# Patient Record
Sex: FEMALE | Race: ASIAN | NOT HISPANIC OR LATINO
[De-identification: names, ages, dates, MRNs, and addresses within clinical notes are randomized per-mention and may not be internally consistent; named-entity substitution may affect disease eponyms.]

---

## 2019-04-09 PROBLEM — Z00.00 ENCOUNTER FOR PREVENTIVE HEALTH EXAMINATION: Status: ACTIVE | Noted: 2019-04-09

## 2019-05-17 ENCOUNTER — APPOINTMENT (OUTPATIENT)
Dept: DERMATOLOGY | Facility: CLINIC | Age: 29
End: 2019-05-17
Payer: COMMERCIAL

## 2019-05-17 VITALS
WEIGHT: 125 LBS | HEIGHT: 62 IN | BODY MASS INDEX: 23 KG/M2 | DIASTOLIC BLOOD PRESSURE: 70 MMHG | SYSTOLIC BLOOD PRESSURE: 90 MMHG

## 2019-05-17 DIAGNOSIS — L81.0 POSTINFLAMMATORY HYPERPIGMENTATION: ICD-10-CM

## 2019-05-17 DIAGNOSIS — Z87.2 PERSONAL HISTORY OF DISEASES OF THE SKIN AND SUBCUTANEOUS TISSUE: ICD-10-CM

## 2019-05-17 DIAGNOSIS — L70.0 ACNE VULGARIS: ICD-10-CM

## 2019-05-17 DIAGNOSIS — Z91.89 OTHER SPECIFIED PERSONAL RISK FACTORS, NOT ELSEWHERE CLASSIFIED: ICD-10-CM

## 2019-05-17 PROCEDURE — 99203 OFFICE O/P NEW LOW 30 MIN: CPT

## 2019-05-17 RX ORDER — AZELAIC ACID 0.15 G/G
15 GEL TOPICAL DAILY
Qty: 1 | Refills: 3 | Status: ACTIVE | COMMUNITY
Start: 2019-05-17 | End: 1900-01-01

## 2019-05-17 RX ORDER — CLINDAMYCIN PHOSPHATE 10 MG/ML
1 LOTION TOPICAL
Qty: 1 | Refills: 5 | Status: ACTIVE | COMMUNITY
Start: 2019-05-17 | End: 1900-01-01

## 2019-05-22 ENCOUNTER — APPOINTMENT (OUTPATIENT)
Dept: OBGYN | Facility: CLINIC | Age: 29
End: 2019-05-22
Payer: COMMERCIAL

## 2019-05-22 VITALS
SYSTOLIC BLOOD PRESSURE: 100 MMHG | DIASTOLIC BLOOD PRESSURE: 63 MMHG | HEIGHT: 62 IN | BODY MASS INDEX: 24.66 KG/M2 | WEIGHT: 134 LBS

## 2019-05-22 DIAGNOSIS — N80.9 ENDOMETRIOSIS, UNSPECIFIED: ICD-10-CM

## 2019-05-22 DIAGNOSIS — Z01.411 ENCOUNTER FOR GYNECOLOGICAL EXAMINATION (GENERAL) (ROUTINE) WITH ABNORMAL FINDINGS: ICD-10-CM

## 2019-05-22 DIAGNOSIS — N83.292 OTHER OVARIAN CYST, LEFT SIDE: ICD-10-CM

## 2019-05-22 PROCEDURE — 99385 PREV VISIT NEW AGE 18-39: CPT

## 2019-05-22 RX ORDER — LEVONORGESTREL AND ETHINYL ESTRADIOL 0.1-0.02MG
KIT ORAL
Refills: 0 | Status: ACTIVE | COMMUNITY

## 2019-05-22 NOTE — CHIEF COMPLAINT
[Annual Visit] : annual visit [FreeTextEntry1] : 28 y.o P 0000  LMP 2019, presents for establishment of GYN care . pt gives hx of having a left ovarian cystectomy, in  and was diagnosed with endometriosis . pt reports dyspareunia. pt had been receiving OCP's to control symptomatology of endometriosis. ( [place]). . pt stopped taking OCP ' s from 2018, until 2019. and was without pelvic pain,  but went back on the pills in March . pt also describes vaginismus. . PMH- Acne Vulgaris, spontaneous pneumothorax.. PSHx - Laparoscopic Ovarian Cystectomy . and diagnosis of endometriosis FH - Diabetes - PGM, (  ). Pancreatic ca - MGM ( ). , SH- negative, ROS currently unemployed. pt is looking to conceive, pt just finished residency in Orthodontics.

## 2019-05-23 ENCOUNTER — ASOB RESULT (OUTPATIENT)
Age: 29
End: 2019-05-23

## 2019-05-23 ENCOUNTER — APPOINTMENT (OUTPATIENT)
Dept: OBGYN | Facility: CLINIC | Age: 29
End: 2019-05-23
Payer: COMMERCIAL

## 2019-05-23 PROCEDURE — 76830 TRANSVAGINAL US NON-OB: CPT

## 2019-05-24 LAB
C TRACH RRNA SPEC QL NAA+PROBE: NOT DETECTED
N GONORRHOEA RRNA SPEC QL NAA+PROBE: NOT DETECTED
SOURCE AMPLIFICATION: NORMAL

## 2019-05-28 LAB — CYTOLOGY CVX/VAG DOC THIN PREP: NORMAL

## 2019-08-27 ENCOUNTER — APPOINTMENT (OUTPATIENT)
Dept: OBGYN | Facility: CLINIC | Age: 29
End: 2019-08-27

## 2019-08-29 ENCOUNTER — APPOINTMENT (OUTPATIENT)
Dept: OBGYN | Facility: CLINIC | Age: 29
End: 2019-08-29

## 2019-08-29 ENCOUNTER — OTHER (OUTPATIENT)
Age: 29
End: 2019-08-29

## 2019-08-30 LAB
ESTRADIOL SERPL-MCNC: 21 PG/ML
FSH SERPL-MCNC: 4.7 IU/L

## 2019-09-03 LAB — ANTI-MUELLERIAN HORMONE: 4.42 NG/ML

## 2019-10-03 ENCOUNTER — FORM ENCOUNTER (OUTPATIENT)
Age: 29
End: 2019-10-03

## 2019-10-04 ENCOUNTER — OUTPATIENT (OUTPATIENT)
Dept: OUTPATIENT SERVICES | Facility: HOSPITAL | Age: 29
LOS: 1 days | End: 2019-10-04
Payer: COMMERCIAL

## 2019-10-04 DIAGNOSIS — N80.9 ENDOMETRIOSIS, UNSPECIFIED: ICD-10-CM

## 2019-10-04 LAB
HCG UR-SCNC: NEGATIVE — SIGNIFICANT CHANGE UP
SP GR UR: 1.03 — SIGNIFICANT CHANGE UP (ref 1–1.03)

## 2019-10-04 PROCEDURE — 58340 CATHETER FOR HYSTEROGRAPHY: CPT | Mod: GC

## 2019-10-04 PROCEDURE — 74740 X-RAY FEMALE GENITAL TRACT: CPT | Mod: 26,GC

## 2019-10-08 ENCOUNTER — CHART COPY (OUTPATIENT)
Age: 29
End: 2019-10-08

## 2019-10-08 DIAGNOSIS — N97.9 FEMALE INFERTILITY, UNSPECIFIED: ICD-10-CM

## 2019-10-25 ENCOUNTER — APPOINTMENT (OUTPATIENT)
Dept: HUMAN REPRODUCTION | Facility: CLINIC | Age: 29
End: 2019-10-25

## 2019-11-07 ENCOUNTER — APPOINTMENT (OUTPATIENT)
Dept: HUMAN REPRODUCTION | Facility: CLINIC | Age: 29
End: 2019-11-07
Payer: COMMERCIAL

## 2019-11-07 PROCEDURE — 99205 OFFICE O/P NEW HI 60 MIN: CPT | Mod: 25

## 2019-11-07 PROCEDURE — 76830 TRANSVAGINAL US NON-OB: CPT

## 2019-12-10 ENCOUNTER — APPOINTMENT (OUTPATIENT)
Dept: HUMAN REPRODUCTION | Facility: CLINIC | Age: 29
End: 2019-12-10
Payer: COMMERCIAL

## 2019-12-10 PROCEDURE — 99213 OFFICE O/P EST LOW 20 MIN: CPT | Mod: 25

## 2019-12-10 PROCEDURE — 76830 TRANSVAGINAL US NON-OB: CPT

## 2019-12-18 ENCOUNTER — APPOINTMENT (OUTPATIENT)
Dept: HUMAN REPRODUCTION | Facility: CLINIC | Age: 29
End: 2019-12-18
Payer: COMMERCIAL

## 2019-12-18 PROCEDURE — 99213 OFFICE O/P EST LOW 20 MIN: CPT | Mod: 25

## 2019-12-18 PROCEDURE — 76830 TRANSVAGINAL US NON-OB: CPT

## 2019-12-20 ENCOUNTER — APPOINTMENT (OUTPATIENT)
Dept: HUMAN REPRODUCTION | Facility: CLINIC | Age: 29
End: 2019-12-20
Payer: COMMERCIAL

## 2019-12-20 PROCEDURE — 76830 TRANSVAGINAL US NON-OB: CPT

## 2019-12-20 PROCEDURE — 99213 OFFICE O/P EST LOW 20 MIN: CPT | Mod: 25

## 2019-12-23 ENCOUNTER — APPOINTMENT (OUTPATIENT)
Dept: HUMAN REPRODUCTION | Facility: CLINIC | Age: 29
End: 2019-12-23
Payer: COMMERCIAL

## 2019-12-23 PROCEDURE — 99213 OFFICE O/P EST LOW 20 MIN: CPT | Mod: 25

## 2019-12-23 PROCEDURE — 58322 ARTIFICIAL INSEMINATION: CPT

## 2020-01-06 ENCOUNTER — APPOINTMENT (OUTPATIENT)
Dept: HUMAN REPRODUCTION | Facility: CLINIC | Age: 30
End: 2020-01-06
Payer: COMMERCIAL

## 2020-01-06 PROCEDURE — 99213 OFFICE O/P EST LOW 20 MIN: CPT | Mod: 25

## 2020-01-06 PROCEDURE — 76830 TRANSVAGINAL US NON-OB: CPT

## 2020-01-15 ENCOUNTER — APPOINTMENT (OUTPATIENT)
Dept: HUMAN REPRODUCTION | Facility: CLINIC | Age: 30
End: 2020-01-15
Payer: COMMERCIAL

## 2020-01-15 PROCEDURE — 99213 OFFICE O/P EST LOW 20 MIN: CPT | Mod: 25

## 2020-01-15 PROCEDURE — 76830 TRANSVAGINAL US NON-OB: CPT

## 2020-01-17 ENCOUNTER — APPOINTMENT (OUTPATIENT)
Dept: HUMAN REPRODUCTION | Facility: CLINIC | Age: 30
End: 2020-01-17
Payer: COMMERCIAL

## 2020-01-17 PROCEDURE — 99213 OFFICE O/P EST LOW 20 MIN: CPT | Mod: 25

## 2020-01-17 PROCEDURE — 76830 TRANSVAGINAL US NON-OB: CPT

## 2020-01-20 ENCOUNTER — APPOINTMENT (OUTPATIENT)
Dept: HUMAN REPRODUCTION | Facility: CLINIC | Age: 30
End: 2020-01-20
Payer: COMMERCIAL

## 2020-01-20 PROCEDURE — 58322 ARTIFICIAL INSEMINATION: CPT

## 2020-01-20 PROCEDURE — 99213 OFFICE O/P EST LOW 20 MIN: CPT | Mod: 25

## 2020-01-20 PROCEDURE — 89261 SPERM ISOLATION COMPLEX: CPT

## 2020-02-03 ENCOUNTER — APPOINTMENT (OUTPATIENT)
Dept: HUMAN REPRODUCTION | Facility: CLINIC | Age: 30
End: 2020-02-03
Payer: COMMERCIAL

## 2020-02-03 PROCEDURE — 76830 TRANSVAGINAL US NON-OB: CPT

## 2020-02-03 PROCEDURE — 99213 OFFICE O/P EST LOW 20 MIN: CPT | Mod: 25

## 2020-02-13 ENCOUNTER — APPOINTMENT (OUTPATIENT)
Dept: HUMAN REPRODUCTION | Facility: CLINIC | Age: 30
End: 2020-02-13
Payer: COMMERCIAL

## 2020-02-13 PROCEDURE — 76830 TRANSVAGINAL US NON-OB: CPT

## 2020-02-13 PROCEDURE — 99214 OFFICE O/P EST MOD 30 MIN: CPT

## 2020-02-13 PROCEDURE — 99213 OFFICE O/P EST LOW 20 MIN: CPT | Mod: 25

## 2020-02-15 ENCOUNTER — APPOINTMENT (OUTPATIENT)
Dept: HUMAN REPRODUCTION | Facility: CLINIC | Age: 30
End: 2020-02-15
Payer: COMMERCIAL

## 2020-02-15 PROCEDURE — 58322 ARTIFICIAL INSEMINATION: CPT

## 2020-02-15 PROCEDURE — 89261 SPERM ISOLATION COMPLEX: CPT

## 2020-02-15 PROCEDURE — 99213 OFFICE O/P EST LOW 20 MIN: CPT | Mod: 25

## 2020-03-02 ENCOUNTER — APPOINTMENT (OUTPATIENT)
Dept: HUMAN REPRODUCTION | Facility: CLINIC | Age: 30
End: 2020-03-02

## 2020-03-14 ENCOUNTER — EMERGENCY (EMERGENCY)
Facility: HOSPITAL | Age: 30
LOS: 1 days | Discharge: HOME | End: 2020-03-14
Attending: EMERGENCY MEDICINE | Admitting: EMERGENCY MEDICINE
Payer: COMMERCIAL

## 2020-03-14 VITALS
SYSTOLIC BLOOD PRESSURE: 100 MMHG | DIASTOLIC BLOOD PRESSURE: 64 MMHG | RESPIRATION RATE: 16 BRPM | HEART RATE: 125 BPM | TEMPERATURE: 102 F | OXYGEN SATURATION: 100 %

## 2020-03-14 VITALS
DIASTOLIC BLOOD PRESSURE: 61 MMHG | RESPIRATION RATE: 16 BRPM | SYSTOLIC BLOOD PRESSURE: 103 MMHG | OXYGEN SATURATION: 100 % | HEART RATE: 108 BPM | TEMPERATURE: 100 F

## 2020-03-14 LAB
ALBUMIN SERPL ELPH-MCNC: 4.5 G/DL — SIGNIFICANT CHANGE UP (ref 3.3–5)
ALP SERPL-CCNC: 40 U/L — SIGNIFICANT CHANGE UP (ref 40–120)
ALT FLD-CCNC: 15 U/L — SIGNIFICANT CHANGE UP (ref 4–33)
ANION GAP SERPL CALC-SCNC: 16 MMO/L — HIGH (ref 7–14)
APPEARANCE UR: CLEAR — SIGNIFICANT CHANGE UP
AST SERPL-CCNC: 25 U/L — SIGNIFICANT CHANGE UP (ref 4–32)
BACTERIA # UR AUTO: SIGNIFICANT CHANGE UP
BASE EXCESS BLDV CALC-SCNC: 1.4 MMOL/L — SIGNIFICANT CHANGE UP
BASOPHILS # BLD AUTO: 0.01 K/UL — SIGNIFICANT CHANGE UP (ref 0–0.2)
BASOPHILS NFR BLD AUTO: 0.2 % — SIGNIFICANT CHANGE UP (ref 0–2)
BILIRUB SERPL-MCNC: < 0.2 MG/DL — LOW (ref 0.2–1.2)
BILIRUB UR-MCNC: NEGATIVE — SIGNIFICANT CHANGE UP
BLOOD GAS VENOUS - CREATININE: 0.83 MG/DL — SIGNIFICANT CHANGE UP (ref 0.5–1.3)
BLOOD UR QL VISUAL: NEGATIVE — SIGNIFICANT CHANGE UP
BUN SERPL-MCNC: 7 MG/DL — SIGNIFICANT CHANGE UP (ref 7–23)
CALCIUM SERPL-MCNC: 9.3 MG/DL — SIGNIFICANT CHANGE UP (ref 8.4–10.5)
CHLORIDE BLDV-SCNC: 103 MMOL/L — SIGNIFICANT CHANGE UP (ref 96–108)
CHLORIDE SERPL-SCNC: 100 MMOL/L — SIGNIFICANT CHANGE UP (ref 98–107)
CO2 SERPL-SCNC: 22 MMOL/L — SIGNIFICANT CHANGE UP (ref 22–31)
COLOR SPEC: SIGNIFICANT CHANGE UP
CREAT SERPL-MCNC: 0.72 MG/DL — SIGNIFICANT CHANGE UP (ref 0.5–1.3)
EOSINOPHIL # BLD AUTO: 0.01 K/UL — SIGNIFICANT CHANGE UP (ref 0–0.5)
EOSINOPHIL NFR BLD AUTO: 0.2 % — SIGNIFICANT CHANGE UP (ref 0–6)
GAS PNL BLDV: 137 MMOL/L — SIGNIFICANT CHANGE UP (ref 136–146)
GLUCOSE BLDV-MCNC: 108 MG/DL — HIGH (ref 70–99)
GLUCOSE SERPL-MCNC: 114 MG/DL — HIGH (ref 70–99)
GLUCOSE UR-MCNC: NEGATIVE — SIGNIFICANT CHANGE UP
HCO3 BLDV-SCNC: 25 MMOL/L — SIGNIFICANT CHANGE UP (ref 20–27)
HCT VFR BLD CALC: 36.9 % — SIGNIFICANT CHANGE UP (ref 34.5–45)
HCT VFR BLDV CALC: 37.9 % — SIGNIFICANT CHANGE UP (ref 34.5–45)
HGB BLD-MCNC: 12.3 G/DL — SIGNIFICANT CHANGE UP (ref 11.5–15.5)
HGB BLDV-MCNC: 12.3 G/DL — SIGNIFICANT CHANGE UP (ref 11.5–15.5)
IMM GRANULOCYTES NFR BLD AUTO: 0.5 % — SIGNIFICANT CHANGE UP (ref 0–1.5)
KETONES UR-MCNC: SIGNIFICANT CHANGE UP
LACTATE BLDV-MCNC: 1.2 MMOL/L — SIGNIFICANT CHANGE UP (ref 0.5–2)
LEUKOCYTE ESTERASE UR-ACNC: NEGATIVE — SIGNIFICANT CHANGE UP
LYMPHOCYTES # BLD AUTO: 0.27 K/UL — LOW (ref 1–3.3)
LYMPHOCYTES # BLD AUTO: 4.6 % — LOW (ref 13–44)
MCHC RBC-ENTMCNC: 31.2 PG — SIGNIFICANT CHANGE UP (ref 27–34)
MCHC RBC-ENTMCNC: 33.3 % — SIGNIFICANT CHANGE UP (ref 32–36)
MCV RBC AUTO: 93.7 FL — SIGNIFICANT CHANGE UP (ref 80–100)
MONOCYTES # BLD AUTO: 0.4 K/UL — SIGNIFICANT CHANGE UP (ref 0–0.9)
MONOCYTES NFR BLD AUTO: 6.8 % — SIGNIFICANT CHANGE UP (ref 2–14)
NEUTROPHILS # BLD AUTO: 5.2 K/UL — SIGNIFICANT CHANGE UP (ref 1.8–7.4)
NEUTROPHILS NFR BLD AUTO: 87.7 % — HIGH (ref 43–77)
NITRITE UR-MCNC: NEGATIVE — SIGNIFICANT CHANGE UP
NRBC # FLD: 0 K/UL — SIGNIFICANT CHANGE UP (ref 0–0)
PCO2 BLDV: 43 MMHG — SIGNIFICANT CHANGE UP (ref 41–51)
PH BLDV: 7.39 PH — SIGNIFICANT CHANGE UP (ref 7.32–7.43)
PH UR: 7.5 — SIGNIFICANT CHANGE UP (ref 5–8)
PLATELET # BLD AUTO: 166 K/UL — SIGNIFICANT CHANGE UP (ref 150–400)
PMV BLD: 10.3 FL — SIGNIFICANT CHANGE UP (ref 7–13)
PO2 BLDV: 48 MMHG — HIGH (ref 35–40)
POTASSIUM BLDV-SCNC: 3.2 MMOL/L — LOW (ref 3.4–4.5)
POTASSIUM SERPL-MCNC: 3.5 MMOL/L — SIGNIFICANT CHANGE UP (ref 3.5–5.3)
POTASSIUM SERPL-SCNC: 3.5 MMOL/L — SIGNIFICANT CHANGE UP (ref 3.5–5.3)
PROT SERPL-MCNC: 7.3 G/DL — SIGNIFICANT CHANGE UP (ref 6–8.3)
PROT UR-MCNC: 30 — SIGNIFICANT CHANGE UP
RBC # BLD: 3.94 M/UL — SIGNIFICANT CHANGE UP (ref 3.8–5.2)
RBC # FLD: 12.5 % — SIGNIFICANT CHANGE UP (ref 10.3–14.5)
RBC CASTS # UR COMP ASSIST: SIGNIFICANT CHANGE UP (ref 0–?)
SAO2 % BLDV: 80.6 % — SIGNIFICANT CHANGE UP (ref 60–85)
SODIUM SERPL-SCNC: 138 MMOL/L — SIGNIFICANT CHANGE UP (ref 135–145)
SP GR SPEC: 1.02 — SIGNIFICANT CHANGE UP (ref 1–1.04)
SQUAMOUS # UR AUTO: SIGNIFICANT CHANGE UP
UROBILINOGEN FLD QL: NORMAL — SIGNIFICANT CHANGE UP
WBC # BLD: 5.92 K/UL — SIGNIFICANT CHANGE UP (ref 3.8–10.5)
WBC # FLD AUTO: 5.92 K/UL — SIGNIFICANT CHANGE UP (ref 3.8–10.5)
WBC UR QL: SIGNIFICANT CHANGE UP (ref 0–?)

## 2020-03-14 PROCEDURE — 71045 X-RAY EXAM CHEST 1 VIEW: CPT | Mod: 26

## 2020-03-14 PROCEDURE — 99284 EMERGENCY DEPT VISIT MOD MDM: CPT

## 2020-03-14 RX ORDER — ACETAMINOPHEN 500 MG
650 TABLET ORAL ONCE
Refills: 0 | Status: COMPLETED | OUTPATIENT
Start: 2020-03-14 | End: 2020-03-14

## 2020-03-14 RX ORDER — ONDANSETRON 8 MG/1
4 TABLET, FILM COATED ORAL ONCE
Refills: 0 | Status: COMPLETED | OUTPATIENT
Start: 2020-03-14 | End: 2020-03-14

## 2020-03-14 RX ORDER — KETOROLAC TROMETHAMINE 30 MG/ML
15 SYRINGE (ML) INJECTION ONCE
Refills: 0 | Status: DISCONTINUED | OUTPATIENT
Start: 2020-03-14 | End: 2020-03-14

## 2020-03-14 RX ORDER — SODIUM CHLORIDE 9 MG/ML
2000 INJECTION INTRAMUSCULAR; INTRAVENOUS; SUBCUTANEOUS ONCE
Refills: 0 | Status: COMPLETED | OUTPATIENT
Start: 2020-03-14 | End: 2020-03-14

## 2020-03-14 RX ADMIN — Medication 650 MILLIGRAM(S): at 19:15

## 2020-03-14 RX ADMIN — SODIUM CHLORIDE 2000 MILLILITER(S): 9 INJECTION INTRAMUSCULAR; INTRAVENOUS; SUBCUTANEOUS at 17:37

## 2020-03-14 RX ADMIN — Medication 650 MILLIGRAM(S): at 18:49

## 2020-03-14 RX ADMIN — ONDANSETRON 4 MILLIGRAM(S): 8 TABLET, FILM COATED ORAL at 17:37

## 2020-03-14 NOTE — ED PROVIDER NOTE - PATIENT PORTAL LINK FT
You can access the FollowMyHealth Patient Portal offered by [facility] by registering at the following website: [email or web address]. By joining [facility]’s FollowMyHealth portal, you will also be able to view your health information using other applications (apps) compatible with our system.

## 2020-03-14 NOTE — ED ADULT NURSE NOTE - OBJECTIVE STATEMENT
Pt a&ox3 c/o fevers, bodyaches for the past two days, pt works in dentist office, denies recent travel, no past medical history, denies known sick contacts, + nausea, breathing even and unlabored, denies any present pain, skin is warm, pt is tachycardic and febrile, ivl placed, labs sent, will continue to monitor.

## 2020-03-14 NOTE — ED PROVIDER NOTE - PHYSICAL EXAMINATION
febrile, tachycardic, borderline BP, NAD, MMM, oropharynx with no lesions, eyes clear, lungs CTAB, heart sounds normal, abd soft, ND, TTP throughout, no G/R, no CVAT, LEs without edema, wwp, skin normal temperature and color, neuro: alert and oriented, no focal deficits febrile, tachycardic, borderline BP, looks ill, MMM, oropharynx with no lesions, eyes clear, lungs CTAB, heart sounds normal, abd soft, ND, TTP throughout, no G/R, no CVAT, LEs without edema, wwp, skin normal temperature and color, neuro: alert and oriented, no focal deficits

## 2020-03-14 NOTE — ED PROVIDER NOTE - PROGRESS NOTE DETAILS
ED Attending: Rei Gordon signed out to me from Dr. Cabot to f/u and reassess. Feeling much better.  COVID sent by Dr. Cabot. DC precautions discussed.

## 2020-03-14 NOTE — ED PROVIDER NOTE - OBJECTIVE STATEMENT
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  She went to , where she was given tamiflu for flu B.  She then developed NBNB vomiting, NB diarrhea, weakness.  She denies urinary sx.  Does not wear eye protection or an N95 at work.  No recent travel or COVID-19 contacts. 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  She went to , where she was given tamiflu for flu B.  She then developed NBNB vomiting, NB diarrhea, weakness.  She denies urinary sx.  Does not wear eye protection or an N95 at work.  No recent travel or COVID-19 contacts.

## 2020-03-14 NOTE — ED PROVIDER NOTE - CLINICAL SUMMARY MEDICAL DECISION MAKING FREE TEXT BOX
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  She went to , where she was given tamiflu for flu B.  She then developed NBNB vomiting, NB diarrhea, weakness.  She denies urinary sx.  Does not wear eye protection or an N95 at work.  No recent travel or COVID-19 contacts.   Febrile, tachycardic, borderline BP, looks ill on exam.  Given significant exposure to many mouths at close contact, she is a risk for COVID-19 as well as flu.  Will send sepsis labs, UA, CXR, COVID-19, RVP, hydrate, treat sx, reassess. 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  She went to , where she was given tamiflu for flu B.  She then developed NBNB vomiting, NB diarrhea, weakness.  She denies urinary sx.  Does not wear eye protection or an N95 at work.  No recent travel or COVID-19 contacts.  Febrile, tachycardic, borderline BP, looks ill on exam.  Given significant exposure to many mouths at close contact, she is a risk for COVID-19 as well as flu.  Will send sepsis labs, UA, CXR, COVID-19, RVP, hydrate, treat sx, reassess.

## 2020-03-14 NOTE — ED ADULT TRIAGE NOTE - CHIEF COMPLAINT QUOTE
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

## 2020-03-14 NOTE — ED PROVIDER NOTE - NSFOLLOWUPINSTRUCTIONS_ED_ALL_ED_FT
Please take Ibuprofen 600 mg every 6 hours as needed for pain or fever.  You may also Take Tylenol 650 mg every 6 hours.     Follow instructions on discharge packet provided to you.     Return to the ER for trouble breathing, uncontrolled fevers, or other concerning signs. Please see your primary doctor for follow up .

## 2020-03-16 LAB
CULTURE RESULTS: SIGNIFICANT CHANGE UP
SARS-COV-2 RNA SPEC QL NAA+PROBE: SIGNIFICANT CHANGE UP
SPECIMEN SOURCE: SIGNIFICANT CHANGE UP

## 2020-03-17 NOTE — ED POST DISCHARGE NOTE - REASON FOR FOLLOW-UP
Other Patient called asking that we email her a letter that COVID-19 test was negative. Emailed to [email or web address]

## 2020-03-20 LAB
CULTURE RESULTS: SIGNIFICANT CHANGE UP
CULTURE RESULTS: SIGNIFICANT CHANGE UP
SPECIMEN SOURCE: SIGNIFICANT CHANGE UP
SPECIMEN SOURCE: SIGNIFICANT CHANGE UP

## 2020-11-23 ENCOUNTER — APPOINTMENT (OUTPATIENT)
Dept: ANTEPARTUM | Facility: CLINIC | Age: 30
End: 2020-11-23
Payer: COMMERCIAL

## 2020-11-23 ENCOUNTER — ASOB RESULT (OUTPATIENT)
Age: 30
End: 2020-11-23

## 2020-11-23 PROCEDURE — 76811 OB US DETAILED SNGL FETUS: CPT

## 2021-03-27 ENCOUNTER — TRANSCRIPTION ENCOUNTER (OUTPATIENT)
Age: 31
End: 2021-03-27

## 2021-03-27 ENCOUNTER — INPATIENT (INPATIENT)
Facility: HOSPITAL | Age: 31
LOS: 2 days | Discharge: HOME | End: 2021-03-30
Attending: OBSTETRICS & GYNECOLOGY | Admitting: OBSTETRICS & GYNECOLOGY
Payer: COMMERCIAL

## 2021-03-27 DIAGNOSIS — Z3A.00 WEEKS OF GESTATION OF PREGNANCY NOT SPECIFIED: ICD-10-CM

## 2021-03-27 DIAGNOSIS — O26.899 OTHER SPECIFIED PREGNANCY RELATED CONDITIONS, UNSPECIFIED TRIMESTER: ICD-10-CM

## 2021-03-28 VITALS
SYSTOLIC BLOOD PRESSURE: 119 MMHG | RESPIRATION RATE: 16 BRPM | HEART RATE: 78 BPM | TEMPERATURE: 98 F | DIASTOLIC BLOOD PRESSURE: 59 MMHG | OXYGEN SATURATION: 98 %

## 2021-03-28 DIAGNOSIS — Z98.890 OTHER SPECIFIED POSTPROCEDURAL STATES: Chronic | ICD-10-CM

## 2021-03-28 DIAGNOSIS — Z34.80 ENCOUNTER FOR SUPERVISION OF OTHER NORMAL PREGNANCY, UNSPECIFIED TRIMESTER: ICD-10-CM

## 2021-03-28 LAB
BASOPHILS # BLD AUTO: 0.02 K/UL — SIGNIFICANT CHANGE UP (ref 0–0.2)
BASOPHILS NFR BLD AUTO: 0.2 % — SIGNIFICANT CHANGE UP (ref 0–2)
BLD GP AB SCN SERPL QL: POSITIVE — SIGNIFICANT CHANGE UP
COVID-19 SPIKE DOMAIN AB INTERP: NEGATIVE — SIGNIFICANT CHANGE UP
COVID-19 SPIKE DOMAIN ANTIBODY RESULT: 0.4 U/ML — SIGNIFICANT CHANGE UP
EOSINOPHIL # BLD AUTO: 0.04 K/UL — SIGNIFICANT CHANGE UP (ref 0–0.5)
EOSINOPHIL NFR BLD AUTO: 0.4 % — SIGNIFICANT CHANGE UP (ref 0–6)
HCT VFR BLD CALC: 39.5 % — SIGNIFICANT CHANGE UP (ref 34.5–45)
HGB BLD-MCNC: 13.2 G/DL — SIGNIFICANT CHANGE UP (ref 11.5–15.5)
IMM GRANULOCYTES NFR BLD AUTO: 0.4 % — SIGNIFICANT CHANGE UP (ref 0–1.5)
LYMPHOCYTES # BLD AUTO: 2.72 K/UL — SIGNIFICANT CHANGE UP (ref 1–3.3)
LYMPHOCYTES # BLD AUTO: 28.4 % — SIGNIFICANT CHANGE UP (ref 13–44)
MCHC RBC-ENTMCNC: 32.2 PG — SIGNIFICANT CHANGE UP (ref 27–34)
MCHC RBC-ENTMCNC: 33.4 GM/DL — SIGNIFICANT CHANGE UP (ref 32–36)
MCV RBC AUTO: 96.3 FL — SIGNIFICANT CHANGE UP (ref 80–100)
MONOCYTES # BLD AUTO: 0.55 K/UL — SIGNIFICANT CHANGE UP (ref 0–0.9)
MONOCYTES NFR BLD AUTO: 5.7 % — SIGNIFICANT CHANGE UP (ref 2–14)
NEUTROPHILS # BLD AUTO: 6.21 K/UL — SIGNIFICANT CHANGE UP (ref 1.8–7.4)
NEUTROPHILS NFR BLD AUTO: 64.9 % — SIGNIFICANT CHANGE UP (ref 43–77)
NRBC # BLD: 0 /100 WBCS — SIGNIFICANT CHANGE UP (ref 0–0)
PLATELET # BLD AUTO: 165 K/UL — SIGNIFICANT CHANGE UP (ref 150–400)
RBC # BLD: 4.1 M/UL — SIGNIFICANT CHANGE UP (ref 3.8–5.2)
RBC # FLD: 13 % — SIGNIFICANT CHANGE UP (ref 10.3–14.5)
RH IG SCN BLD-IMP: NEGATIVE — SIGNIFICANT CHANGE UP
RH IG SCN BLD-IMP: NEGATIVE — SIGNIFICANT CHANGE UP
SARS-COV-2 IGG+IGM SERPL QL IA: 0.4 U/ML — SIGNIFICANT CHANGE UP
SARS-COV-2 IGG+IGM SERPL QL IA: NEGATIVE — SIGNIFICANT CHANGE UP
SARS-COV-2 RNA SPEC QL NAA+PROBE: SIGNIFICANT CHANGE UP
WBC # BLD: 9.58 K/UL — SIGNIFICANT CHANGE UP (ref 3.8–10.5)
WBC # FLD AUTO: 9.58 K/UL — SIGNIFICANT CHANGE UP (ref 3.8–10.5)

## 2021-03-28 PROCEDURE — 86077 PHYS BLOOD BANK SERV XMATCH: CPT

## 2021-03-28 PROCEDURE — 88307 TISSUE EXAM BY PATHOLOGIST: CPT | Mod: 26

## 2021-03-28 RX ORDER — OXYTOCIN 10 UNIT/ML
333.33 VIAL (ML) INJECTION
Qty: 20 | Refills: 0 | Status: COMPLETED | OUTPATIENT
Start: 2021-03-28 | End: 2021-03-28

## 2021-03-28 RX ORDER — SIMETHICONE 80 MG/1
80 TABLET, CHEWABLE ORAL EVERY 4 HOURS
Refills: 0 | Status: DISCONTINUED | OUTPATIENT
Start: 2021-03-28 | End: 2021-03-30

## 2021-03-28 RX ORDER — BENZOCAINE 10 %
1 GEL (GRAM) MUCOUS MEMBRANE EVERY 6 HOURS
Refills: 0 | Status: DISCONTINUED | OUTPATIENT
Start: 2021-03-28 | End: 2021-03-30

## 2021-03-28 RX ORDER — AER TRAVELER 0.5 G/1
1 SOLUTION RECTAL; TOPICAL EVERY 4 HOURS
Refills: 0 | Status: DISCONTINUED | OUTPATIENT
Start: 2021-03-28 | End: 2021-03-30

## 2021-03-28 RX ORDER — CITRIC ACID/SODIUM CITRATE 300-500 MG
15 SOLUTION, ORAL ORAL EVERY 6 HOURS
Refills: 0 | Status: DISCONTINUED | OUTPATIENT
Start: 2021-03-28 | End: 2021-03-28

## 2021-03-28 RX ORDER — SODIUM CHLORIDE 9 MG/ML
1000 INJECTION, SOLUTION INTRAVENOUS
Refills: 0 | Status: DISCONTINUED | OUTPATIENT
Start: 2021-03-28 | End: 2021-03-30

## 2021-03-28 RX ORDER — AMPICILLIN TRIHYDRATE 250 MG
1 CAPSULE ORAL EVERY 4 HOURS
Refills: 0 | Status: DISCONTINUED | OUTPATIENT
Start: 2021-03-28 | End: 2021-03-28

## 2021-03-28 RX ORDER — OXYTOCIN 10 UNIT/ML
333.33 VIAL (ML) INJECTION
Qty: 20 | Refills: 0 | Status: DISCONTINUED | OUTPATIENT
Start: 2021-03-28 | End: 2021-03-30

## 2021-03-28 RX ORDER — IBUPROFEN 200 MG
600 TABLET ORAL EVERY 6 HOURS
Refills: 0 | Status: DISCONTINUED | OUTPATIENT
Start: 2021-03-28 | End: 2021-03-30

## 2021-03-28 RX ORDER — DIPHENHYDRAMINE HCL 50 MG
25 CAPSULE ORAL EVERY 6 HOURS
Refills: 0 | Status: DISCONTINUED | OUTPATIENT
Start: 2021-03-28 | End: 2021-03-30

## 2021-03-28 RX ORDER — SODIUM CHLORIDE 9 MG/ML
3 INJECTION INTRAMUSCULAR; INTRAVENOUS; SUBCUTANEOUS EVERY 8 HOURS
Refills: 0 | Status: DISCONTINUED | OUTPATIENT
Start: 2021-03-28 | End: 2021-03-30

## 2021-03-28 RX ORDER — HYDROCORTISONE 1 %
1 OINTMENT (GRAM) TOPICAL EVERY 6 HOURS
Refills: 0 | Status: DISCONTINUED | OUTPATIENT
Start: 2021-03-28 | End: 2021-03-30

## 2021-03-28 RX ORDER — LANOLIN
1 OINTMENT (GRAM) TOPICAL EVERY 6 HOURS
Refills: 0 | Status: DISCONTINUED | OUTPATIENT
Start: 2021-03-28 | End: 2021-03-30

## 2021-03-28 RX ORDER — OXYCODONE HYDROCHLORIDE 5 MG/1
5 TABLET ORAL
Refills: 0 | Status: DISCONTINUED | OUTPATIENT
Start: 2021-03-28 | End: 2021-03-30

## 2021-03-28 RX ORDER — OXYCODONE HYDROCHLORIDE 5 MG/1
5 TABLET ORAL ONCE
Refills: 0 | Status: DISCONTINUED | OUTPATIENT
Start: 2021-03-28 | End: 2021-03-30

## 2021-03-28 RX ORDER — SODIUM CHLORIDE 9 MG/ML
1000 INJECTION, SOLUTION INTRAVENOUS
Refills: 0 | Status: DISCONTINUED | OUTPATIENT
Start: 2021-03-28 | End: 2021-03-28

## 2021-03-28 RX ORDER — AMPICILLIN TRIHYDRATE 250 MG
2 CAPSULE ORAL ONCE
Refills: 0 | Status: COMPLETED | OUTPATIENT
Start: 2021-03-28 | End: 2021-03-28

## 2021-03-28 RX ORDER — DIBUCAINE 1 %
1 OINTMENT (GRAM) RECTAL EVERY 6 HOURS
Refills: 0 | Status: DISCONTINUED | OUTPATIENT
Start: 2021-03-28 | End: 2021-03-30

## 2021-03-28 RX ORDER — IBUPROFEN 200 MG
600 TABLET ORAL EVERY 6 HOURS
Refills: 0 | Status: COMPLETED | OUTPATIENT
Start: 2021-03-28 | End: 2022-02-24

## 2021-03-28 RX ORDER — MAGNESIUM HYDROXIDE 400 MG/1
30 TABLET, CHEWABLE ORAL
Refills: 0 | Status: DISCONTINUED | OUTPATIENT
Start: 2021-03-28 | End: 2021-03-30

## 2021-03-28 RX ORDER — ACETAMINOPHEN 500 MG
975 TABLET ORAL
Refills: 0 | Status: DISCONTINUED | OUTPATIENT
Start: 2021-03-28 | End: 2021-03-30

## 2021-03-28 RX ORDER — PRAMOXINE HYDROCHLORIDE 150 MG/15G
1 AEROSOL, FOAM RECTAL EVERY 4 HOURS
Refills: 0 | Status: DISCONTINUED | OUTPATIENT
Start: 2021-03-28 | End: 2021-03-30

## 2021-03-28 RX ORDER — KETOROLAC TROMETHAMINE 30 MG/ML
30 SYRINGE (ML) INJECTION ONCE
Refills: 0 | Status: DISCONTINUED | OUTPATIENT
Start: 2021-03-28 | End: 2021-03-28

## 2021-03-28 RX ORDER — TETANUS TOXOID, REDUCED DIPHTHERIA TOXOID AND ACELLULAR PERTUSSIS VACCINE, ADSORBED 5; 2.5; 8; 8; 2.5 [IU]/.5ML; [IU]/.5ML; UG/.5ML; UG/.5ML; UG/.5ML
0.5 SUSPENSION INTRAMUSCULAR ONCE
Refills: 0 | Status: DISCONTINUED | OUTPATIENT
Start: 2021-03-28 | End: 2021-03-30

## 2021-03-28 RX ADMIN — SODIUM CHLORIDE 3 MILLILITER(S): 9 INJECTION INTRAMUSCULAR; INTRAVENOUS; SUBCUTANEOUS at 21:59

## 2021-03-28 RX ADMIN — Medication 108 GRAM(S): at 06:16

## 2021-03-28 RX ADMIN — Medication 975 MILLIGRAM(S): at 16:51

## 2021-03-28 RX ADMIN — SODIUM CHLORIDE 125 MILLILITER(S): 9 INJECTION, SOLUTION INTRAVENOUS at 02:10

## 2021-03-28 RX ADMIN — Medication 108 GRAM(S): at 09:58

## 2021-03-28 RX ADMIN — Medication 600 MILLIGRAM(S): at 21:17

## 2021-03-28 RX ADMIN — Medication 1000 MILLIUNIT(S)/MIN: at 13:08

## 2021-03-28 RX ADMIN — SODIUM CHLORIDE 125 MILLILITER(S): 9 INJECTION, SOLUTION INTRAVENOUS at 01:55

## 2021-03-28 RX ADMIN — Medication 216 GRAM(S): at 02:05

## 2021-03-28 RX ADMIN — Medication 600 MILLIGRAM(S): at 20:17

## 2021-03-28 RX ADMIN — Medication 30 MILLIGRAM(S): at 13:51

## 2021-03-28 NOTE — OB PROVIDER H&P - NSHPPHYSICALEXAM_GEN_ALL_CORE
VS  T(C): 36.8 (03-28-21 @ 00:39)  HR: 64 (03-28-21 @ 02:53)  BP: 112/65 (03-28-21 @ 01:34)  RR: 16 (03-28-21 @ 00:39)  SpO2: 100% (03-28-21 @ 02:53)    Physical Exam:  General: NAD  Abdomen: Soft, NTTP  SSE: Gross pooling, green tinged fluid; Nitrazine positive  SVE: 3/80/-2    NST Reactive 125BPM, +accels, -decels  TOCO no CTX

## 2021-03-28 NOTE — OB PROVIDER H&P - ATTENDING COMMENTS
Patient admitted for PROM  GBS +, PNC complicated by polyhydramnios  RH neg  3cm on admission  Plan for PO cyotec for induction    goran melvin

## 2021-03-28 NOTE — OB RN TRIAGE NOTE - PMH
Endometriosis    Kidney stone    Ovarian cyst  s/p laproscopic removal  Pneumothorax  spontaneous s/p chest tube

## 2021-03-28 NOTE — OB NEONATOLOGY/PEDIATRICIAN DELIVERY SUMMARY - NSPEDSNEONOTESA_OBGYN_ALL_OB_FT
Baby LOREE is a 39+1 week GA male born at 12:12 to a 29 y/o  mother via . Maternal history of spontaneous pneumothorax with chest tube in , kidney stones, ovarian cysts, endometriosis. IVF pregnancy. Maternal blood type A-. Prenatal labs negative and immune, RPR pending. GBS positive on 3/5, received ampx3. SROM <18hrs at 23:00 3/27 with moderate meconium fluid. Baby born vigorous and crying spontaneously. Warmed, dried, stimulated. EOS 0.08. Apgars 8 / 9. Mom plans to breastfeed, consents to hep b, declines circ. Covid negative.

## 2021-03-28 NOTE — OB PROVIDER H&P - ASSESSMENT
Patient is a 29yo  at 39w1d who presents with LOF since 11p found to be grossly ruptured. Admit to labor and delivery for PROM IOL.    -PO  -Re-evaluate for labor progression at 5a  -NST/TOCO  -Ampicillin for GBS ppx  -Routine labs    Melina Ruiz, PGY3

## 2021-03-28 NOTE — OB PROVIDER LABOR PROGRESS NOTE - NS_SUBJECTIVE/OBJECTIVE_OBGYN_ALL_OB_FT
Patient examined for rectal pressure
Patient examined for progress
pt examined for progress
R1 Progress Note     Patient examined for labor progress. Comfortable following epi placement.

## 2021-03-28 NOTE — OB PROVIDER H&P - CLICK TO LAUNCH ORM
Verbal/written post procedure instructions were given to patient/caregiver./Keep the cast/splint/dressing clean and dry./Instructed patient/caregiver to follow-up with primary care physician./Instructed patient/caregiver regarding signs and symptoms of infection.
.

## 2021-03-28 NOTE — OB PROVIDER LABOR PROGRESS NOTE - ASSESSMENT
labor down  cont peanut ball    gchow md 
peanut ball  continue expectant management  comfortable with epidural     goran melvin 
Plan     EFM cat 1   Cont EFM/[facility]     Sharda Maharaj MD PGY1  d/w Dr. Sinha 
start pushing  expect     goran melvin

## 2021-03-28 NOTE — OB PROVIDER H&P - NS_OBGYNHISTORY_OBGYN_ALL_OB_FT
Speaking Coherently
OBH: Current, IVF Pregnancy, succenturiate placental lobe,    GYNH: ovarian cyst s/p laparoscopic cystectomy, endometriosis

## 2021-03-28 NOTE — OB PROVIDER H&P - PSH
H/O chest tube placement  2012  H/O ovarian cystectomy  2017, [facility] [place]

## 2021-03-28 NOTE — OB PROVIDER H&P - HISTORY OF PRESENT ILLNESS
Patient is a 31yo  at 39w 1d who presents w/LOF since 11p. Patient reports large gush of greenish brown fluid at 11p followed by continued leakage. She denies any CTX or VB. She endorses FM, but less than baseline this evening. PNC c/b polyhydramnios per patient.    GBS +  EFW 4000g by jose Friday

## 2021-03-29 ENCOUNTER — TRANSCRIPTION ENCOUNTER (OUTPATIENT)
Age: 31
End: 2021-03-29

## 2021-03-29 LAB
KLEIHAUER-BETKE CALCULATION: 0 % — SIGNIFICANT CHANGE UP (ref 0–0.3)
T PALLIDUM AB TITR SER: NEGATIVE — SIGNIFICANT CHANGE UP

## 2021-03-29 RX ORDER — POLYETHYLENE GLYCOL 3350 17 G/17G
17 POWDER, FOR SOLUTION ORAL DAILY
Refills: 0 | Status: DISCONTINUED | OUTPATIENT
Start: 2021-03-29 | End: 2021-03-30

## 2021-03-29 RX ORDER — CHOLECALCIFEROL (VITAMIN D3) 125 MCG
1 CAPSULE ORAL
Qty: 0 | Refills: 0 | DISCHARGE

## 2021-03-29 RX ADMIN — Medication 600 MILLIGRAM(S): at 17:54

## 2021-03-29 RX ADMIN — Medication 975 MILLIGRAM(S): at 01:04

## 2021-03-29 RX ADMIN — Medication 975 MILLIGRAM(S): at 21:45

## 2021-03-29 RX ADMIN — Medication 1 TABLET(S): at 17:53

## 2021-03-29 RX ADMIN — Medication 975 MILLIGRAM(S): at 14:06

## 2021-03-29 RX ADMIN — Medication 975 MILLIGRAM(S): at 15:00

## 2021-03-29 RX ADMIN — Medication 600 MILLIGRAM(S): at 11:00

## 2021-03-29 RX ADMIN — Medication 975 MILLIGRAM(S): at 00:04

## 2021-03-29 RX ADMIN — Medication 975 MILLIGRAM(S): at 21:16

## 2021-03-29 RX ADMIN — Medication 975 MILLIGRAM(S): at 06:54

## 2021-03-29 RX ADMIN — Medication 975 MILLIGRAM(S): at 06:13

## 2021-03-29 RX ADMIN — Medication 600 MILLIGRAM(S): at 23:51

## 2021-03-29 RX ADMIN — Medication 600 MILLIGRAM(S): at 05:29

## 2021-03-29 RX ADMIN — Medication 600 MILLIGRAM(S): at 04:29

## 2021-03-29 RX ADMIN — POLYETHYLENE GLYCOL 3350 17 GRAM(S): 17 POWDER, FOR SOLUTION ORAL at 19:05

## 2021-03-29 RX ADMIN — Medication 600 MILLIGRAM(S): at 09:55

## 2021-03-29 RX ADMIN — Medication 600 MILLIGRAM(S): at 19:00

## 2021-03-29 NOTE — DISCHARGE NOTE OB - PATIENT PORTAL LINK FT
You can access the FollowMyHealth Patient Portal offered by [facility] by registering at the following website: [email or web address]. By joining [facility]’s FollowMyHealth portal, you will also be able to view your health information using other applications (apps) compatible with our system.

## 2021-03-29 NOTE — LACTATION INITIAL EVALUATION - LACTATION INTERVENTIONS
Early breastfeeding management reviewed including signs and components of an effective feeding, minimum daily intake of 8-12 feedings and minimum daily wet and stool diapers. Encouraged use of feeding log./initiate/review early breastfeeding management guidelines/initiate/review techniques for position and latch/post discharge community resources provided/initiate/review supplementation plan due to medical indications/review techniques to increase milk supply/review techniques to manage sore nipples/engorgement/initiate/review breast massage/compression

## 2021-03-29 NOTE — PROGRESS NOTE ADULT - PROBLEM SELECTOR PLAN 1
Increase OOB  Regular diet  PO Pain protocol  Routine Postpartum Care  RH neg, if baby RH + --> will give rhogam    Anika Burrows PA-C

## 2021-03-30 VITALS
DIASTOLIC BLOOD PRESSURE: 81 MMHG | RESPIRATION RATE: 18 BRPM | SYSTOLIC BLOOD PRESSURE: 125 MMHG | OXYGEN SATURATION: 98 % | HEART RATE: 74 BPM | TEMPERATURE: 98 F

## 2021-03-30 PROCEDURE — 85025 COMPLETE CBC W/AUTO DIFF WBC: CPT

## 2021-03-30 PROCEDURE — 86769 SARS-COV-2 COVID-19 ANTIBODY: CPT

## 2021-03-30 PROCEDURE — 86850 RBC ANTIBODY SCREEN: CPT

## 2021-03-30 PROCEDURE — 59050 FETAL MONITOR W/REPORT: CPT

## 2021-03-30 PROCEDURE — 86901 BLOOD TYPING SEROLOGIC RH(D): CPT

## 2021-03-30 PROCEDURE — 59025 FETAL NON-STRESS TEST: CPT

## 2021-03-30 PROCEDURE — 90707 MMR VACCINE SC: CPT

## 2021-03-30 PROCEDURE — 88307 TISSUE EXAM BY PATHOLOGIST: CPT

## 2021-03-30 PROCEDURE — 86900 BLOOD TYPING SEROLOGIC ABO: CPT

## 2021-03-30 PROCEDURE — 87635 SARS-COV-2 COVID-19 AMP PRB: CPT

## 2021-03-30 PROCEDURE — 86780 TREPONEMA PALLIDUM: CPT

## 2021-03-30 PROCEDURE — 85460 HEMOGLOBIN FETAL: CPT

## 2021-03-30 PROCEDURE — G0463: CPT

## 2021-03-30 PROCEDURE — 86870 RBC ANTIBODY IDENTIFICATION: CPT

## 2021-03-30 RX ADMIN — Medication 975 MILLIGRAM(S): at 03:30

## 2021-03-30 RX ADMIN — Medication 975 MILLIGRAM(S): at 09:40

## 2021-03-30 RX ADMIN — Medication 600 MILLIGRAM(S): at 12:43

## 2021-03-30 RX ADMIN — Medication 0.5 MILLILITER(S): at 10:49

## 2021-03-30 RX ADMIN — Medication 600 MILLIGRAM(S): at 05:34

## 2021-03-30 RX ADMIN — Medication 600 MILLIGRAM(S): at 12:06

## 2021-03-30 RX ADMIN — Medication 975 MILLIGRAM(S): at 03:02

## 2021-03-30 RX ADMIN — Medication 975 MILLIGRAM(S): at 09:07

## 2021-03-30 RX ADMIN — Medication 600 MILLIGRAM(S): at 00:46

## 2021-03-30 RX ADMIN — Medication 600 MILLIGRAM(S): at 06:05

## 2021-03-30 NOTE — PROGRESS NOTE ADULT - SUBJECTIVE AND OBJECTIVE BOX
S: Patient is doing well without complaints. Tolerates regular diet. She states lochia is in WNL. Ambulating without difficulty. Denies N/V. Voiding freely. Passing flatus. Pain well controlled with oral pain medications. Denies any HA/vision changes, CP/SOB, F/C/S.    O: Vital Signs Last 24 Hrs  T(C): 36.6 (30 Mar 2021 05:32), Max: 36.8 (29 Mar 2021 17:00)  T(F): 97.8 (30 Mar 2021 05:32), Max: 98.2 (29 Mar 2021 17:00)  HR: 74 (30 Mar 2021 05:32) (74 - 74)  BP: 125/81 (30 Mar 2021 05:32) (114/75 - 125/81)  BP(mean): --  RR: 18 (30 Mar 2021 05:32) (18 - 18)  SpO2: 98% (30 Mar 2021 05:32) (98% - 98%)    Physical Exam:  General: NAD  Abdomen: soft, non-tender, non-distended, fundus firm  Vaginal: deferred  Ext: NTBL    Labs:                MEDICATIONS  (STANDING):  acetaminophen   Tablet .. 975 milliGRAM(s) Oral <User Schedule>  diphtheria/tetanus/pertussis (acellular) Vaccine (ADAcel) 0.5 milliLiter(s) IntraMuscular once  ibuprofen  Tablet. 600 milliGRAM(s) Oral every 6 hours  lactated ringers. 1000 milliLiter(s) (125 mL/Hr) IV Continuous <Continuous>  measles/mumps/rubella Vaccine 0.5 milliLiter(s) SubCutaneous once  oxytocin Infusion 333.333 milliUNIT(s)/Min (1000 mL/Hr) IV Continuous <Continuous>  polyethylene glycol 3350 17 Gram(s) Oral daily  prenatal multivitamin 1 Tablet(s) Oral daily  sodium chloride 0.9% lock flush 3 milliLiter(s) IV Push every 8 hours    
Postpartum Note- PPD#1    Allergies    codeine (Vomiting; Nausea)    Blood Type A   Negative    RPR : Negative    Rubella: Immune    S:Patient is a  30y         PPD#1         S/P     Patient w/o complaints, pain is controlled.    Pt is OOB, tolerating PO, passing flatus. Lochia WNL.     Feeding: Breast    O:  Vital Signs Last 24 Hrs  T(C): 36.7 (29 Mar 2021 05:09), Max: 36.9 (28 Mar 2021 10:00)  T(F): 98.1 (29 Mar 2021 05:09), Max: 98.42 (28 Mar 2021 10:00)  HR: 65 (29 Mar 2021 05:09) (53 - 129)  BP: 109/76 (29 Mar 2021 05:09) (102/57 - 143/66)  BP(mean): 77 (28 Mar 2021 15:50) (77 - 86)  RR: 18 (29 Mar 2021 05:09) (16 - 18)  SpO2: 97% (29 Mar 2021 05:09) (83% - 100%)     Gen: NAD  Abdomen: Soft, nontender, non-distended, fundus firm.  Vaginal: Lochia WNL  Ext:  Neg calf tenderness    LABS:    Hemoglobin: 13.2 g/dL ( @ 02:37)      Hematocrit: 39.5 % ( @ 02:37)

## 2021-03-30 NOTE — PROGRESS NOTE ADULT - ASSESSMENT
A/P:  30y  PPD # 1      S/P                       doing well      Current Issues: none  PAST MEDICAL & SURGICAL HISTORY:  Ovarian cyst  s/p laproscopic removal    Kidney stone    Endometriosis    Pneumothorax  spontaneous s/p chest tube    H/O chest tube placement      H/O ovarian cystectomy  2017, [facility] [place]
s/p

## 2021-04-09 ENCOUNTER — NON-APPOINTMENT (OUTPATIENT)
Age: 31
End: 2021-04-09

## 2022-03-23 ENCOUNTER — NON-APPOINTMENT (OUTPATIENT)
Age: 32
End: 2022-03-23

## 2022-08-26 PROBLEM — J93.9 PNEUMOTHORAX, UNSPECIFIED: Chronic | Status: ACTIVE | Noted: 2021-03-28

## 2022-08-26 PROBLEM — N80.9 ENDOMETRIOSIS, UNSPECIFIED: Chronic | Status: ACTIVE | Noted: 2021-03-28

## 2022-10-20 ENCOUNTER — ASOB RESULT (OUTPATIENT)
Age: 32
End: 2022-10-20

## 2022-10-20 ENCOUNTER — APPOINTMENT (OUTPATIENT)
Dept: ANTEPARTUM | Facility: CLINIC | Age: 32
End: 2022-10-20

## 2022-10-20 PROCEDURE — 76811 OB US DETAILED SNGL FETUS: CPT

## 2022-10-26 ENCOUNTER — OUTPATIENT (OUTPATIENT)
Dept: OUTPATIENT SERVICES | Age: 32
LOS: 1 days | Discharge: HOME | End: 2022-10-26

## 2022-10-26 DIAGNOSIS — Z98.890 OTHER SPECIFIED POSTPROCEDURAL STATES: Chronic | ICD-10-CM

## 2022-10-28 ENCOUNTER — APPOINTMENT (OUTPATIENT)
Dept: PEDIATRIC CARDIOLOGY | Facility: CLINIC | Age: 32
End: 2022-10-28

## 2022-10-28 PROCEDURE — 93325 DOPPLER ECHO COLOR FLOW MAPG: CPT

## 2022-10-28 PROCEDURE — 99202 OFFICE O/P NEW SF 15 MIN: CPT | Mod: 25

## 2022-10-28 PROCEDURE — 76827 ECHO EXAM OF FETAL HEART: CPT

## 2022-10-28 PROCEDURE — 76825 ECHO EXAM OF FETAL HEART: CPT

## 2022-12-15 ENCOUNTER — APPOINTMENT (OUTPATIENT)
Dept: ANTEPARTUM | Facility: CLINIC | Age: 32
End: 2022-12-15

## 2022-12-16 ENCOUNTER — APPOINTMENT (OUTPATIENT)
Dept: PEDIATRIC CARDIOLOGY | Facility: CLINIC | Age: 32
End: 2022-12-16
Payer: COMMERCIAL

## 2022-12-16 PROCEDURE — 76828 ECHO EXAM OF FETAL HEART: CPT

## 2022-12-16 PROCEDURE — 76826 ECHO EXAM OF FETAL HEART: CPT

## 2022-12-16 PROCEDURE — 93325 DOPPLER ECHO COLOR FLOW MAPG: CPT

## 2022-12-16 PROCEDURE — 99212 OFFICE O/P EST SF 10 MIN: CPT | Mod: 25

## 2023-03-02 ENCOUNTER — INPATIENT (INPATIENT)
Facility: HOSPITAL | Age: 33
LOS: 1 days | Discharge: HOME | End: 2023-03-04
Attending: OBSTETRICS & GYNECOLOGY | Admitting: OBSTETRICS & GYNECOLOGY
Payer: COMMERCIAL

## 2023-03-02 VITALS — HEART RATE: 91 BPM | OXYGEN SATURATION: 100 % | DIASTOLIC BLOOD PRESSURE: 68 MMHG | SYSTOLIC BLOOD PRESSURE: 123 MMHG

## 2023-03-02 DIAGNOSIS — O26.899 OTHER SPECIFIED PREGNANCY RELATED CONDITIONS, UNSPECIFIED TRIMESTER: ICD-10-CM

## 2023-03-02 DIAGNOSIS — Z98.890 OTHER SPECIFIED POSTPROCEDURAL STATES: Chronic | ICD-10-CM

## 2023-03-02 DIAGNOSIS — O42.90 PREMATURE RUPTURE OF MEMBRANES, UNSPECIFIED AS TO LENGTH OF TIME BETWEEN RUPTURE AND ONSET OF LABOR, UNSPECIFIED WEEKS OF GESTATION: ICD-10-CM

## 2023-03-02 DIAGNOSIS — Z34.80 ENCOUNTER FOR SUPERVISION OF OTHER NORMAL PREGNANCY, UNSPECIFIED TRIMESTER: ICD-10-CM

## 2023-03-02 LAB
BASOPHILS # BLD AUTO: 0.02 K/UL — SIGNIFICANT CHANGE UP (ref 0–0.2)
BASOPHILS NFR BLD AUTO: 0.2 % — SIGNIFICANT CHANGE UP (ref 0–2)
BLD GP AB SCN SERPL QL: NEGATIVE — SIGNIFICANT CHANGE UP
EOSINOPHIL # BLD AUTO: 0.03 K/UL — SIGNIFICANT CHANGE UP (ref 0–0.5)
EOSINOPHIL NFR BLD AUTO: 0.4 % — SIGNIFICANT CHANGE UP (ref 0–6)
HCT VFR BLD CALC: 39.9 % — SIGNIFICANT CHANGE UP (ref 34.5–45)
HGB BLD-MCNC: 13 G/DL — SIGNIFICANT CHANGE UP (ref 11.5–15.5)
IMM GRANULOCYTES NFR BLD AUTO: 0.7 % — SIGNIFICANT CHANGE UP (ref 0–0.9)
LYMPHOCYTES # BLD AUTO: 1.77 K/UL — SIGNIFICANT CHANGE UP (ref 1–3.3)
LYMPHOCYTES # BLD AUTO: 20.8 % — SIGNIFICANT CHANGE UP (ref 13–44)
MCHC RBC-ENTMCNC: 31.4 PG — SIGNIFICANT CHANGE UP (ref 27–34)
MCHC RBC-ENTMCNC: 32.6 GM/DL — SIGNIFICANT CHANGE UP (ref 32–36)
MCV RBC AUTO: 96.4 FL — SIGNIFICANT CHANGE UP (ref 80–100)
MONOCYTES # BLD AUTO: 0.36 K/UL — SIGNIFICANT CHANGE UP (ref 0–0.9)
MONOCYTES NFR BLD AUTO: 4.2 % — SIGNIFICANT CHANGE UP (ref 2–14)
NEUTROPHILS # BLD AUTO: 6.25 K/UL — SIGNIFICANT CHANGE UP (ref 1.8–7.4)
NEUTROPHILS NFR BLD AUTO: 73.7 % — SIGNIFICANT CHANGE UP (ref 43–77)
NRBC # BLD: 0 /100 WBCS — SIGNIFICANT CHANGE UP (ref 0–0)
PLATELET # BLD AUTO: 177 K/UL — SIGNIFICANT CHANGE UP (ref 150–400)
RBC # BLD: 4.14 M/UL — SIGNIFICANT CHANGE UP (ref 3.8–5.2)
RBC # FLD: 14.1 % — SIGNIFICANT CHANGE UP (ref 10.3–14.5)
RH IG SCN BLD-IMP: NEGATIVE — SIGNIFICANT CHANGE UP
SARS-COV-2 RNA SPEC QL NAA+PROBE: SIGNIFICANT CHANGE UP
WBC # BLD: 8.49 K/UL — SIGNIFICANT CHANGE UP (ref 3.8–10.5)
WBC # FLD AUTO: 8.49 K/UL — SIGNIFICANT CHANGE UP (ref 3.8–10.5)

## 2023-03-02 RX ORDER — OXYTOCIN 10 UNIT/ML
41.67 VIAL (ML) INJECTION
Qty: 20 | Refills: 0 | Status: DISCONTINUED | OUTPATIENT
Start: 2023-03-02 | End: 2023-03-04

## 2023-03-02 RX ORDER — KETOROLAC TROMETHAMINE 30 MG/ML
15 SYRINGE (ML) INJECTION ONCE
Refills: 0 | Status: DISCONTINUED | OUTPATIENT
Start: 2023-03-02 | End: 2023-03-04

## 2023-03-02 RX ORDER — BENZOCAINE 10 %
1 GEL (GRAM) MUCOUS MEMBRANE EVERY 6 HOURS
Refills: 0 | Status: DISCONTINUED | OUTPATIENT
Start: 2023-03-02 | End: 2023-03-04

## 2023-03-02 RX ORDER — CITRIC ACID/SODIUM CITRATE 300-500 MG
15 SOLUTION, ORAL ORAL EVERY 6 HOURS
Refills: 0 | Status: DISCONTINUED | OUTPATIENT
Start: 2023-03-02 | End: 2023-03-02

## 2023-03-02 RX ORDER — OXYTOCIN 10 UNIT/ML
VIAL (ML) INJECTION
Qty: 30 | Refills: 0 | Status: DISCONTINUED | OUTPATIENT
Start: 2023-03-02 | End: 2023-03-02

## 2023-03-02 RX ORDER — SODIUM CHLORIDE 9 MG/ML
3 INJECTION INTRAMUSCULAR; INTRAVENOUS; SUBCUTANEOUS EVERY 8 HOURS
Refills: 0 | Status: DISCONTINUED | OUTPATIENT
Start: 2023-03-02 | End: 2023-03-04

## 2023-03-02 RX ORDER — DIPHENHYDRAMINE HCL 50 MG
25 CAPSULE ORAL EVERY 6 HOURS
Refills: 0 | Status: DISCONTINUED | OUTPATIENT
Start: 2023-03-02 | End: 2023-03-04

## 2023-03-02 RX ORDER — CHLORHEXIDINE GLUCONATE 213 G/1000ML
1 SOLUTION TOPICAL ONCE
Refills: 0 | Status: DISCONTINUED | OUTPATIENT
Start: 2023-03-02 | End: 2023-03-02

## 2023-03-02 RX ORDER — SODIUM CHLORIDE 9 MG/ML
1000 INJECTION, SOLUTION INTRAVENOUS
Refills: 0 | Status: DISCONTINUED | OUTPATIENT
Start: 2023-03-02 | End: 2023-03-02

## 2023-03-02 RX ORDER — PRAMOXINE HYDROCHLORIDE 150 MG/15G
1 AEROSOL, FOAM RECTAL EVERY 4 HOURS
Refills: 0 | Status: DISCONTINUED | OUTPATIENT
Start: 2023-03-02 | End: 2023-03-04

## 2023-03-02 RX ORDER — AMPICILLIN TRIHYDRATE 250 MG
2 CAPSULE ORAL ONCE
Refills: 0 | Status: COMPLETED | OUTPATIENT
Start: 2023-03-02 | End: 2023-03-02

## 2023-03-02 RX ORDER — ACETAMINOPHEN 500 MG
975 TABLET ORAL
Refills: 0 | Status: DISCONTINUED | OUTPATIENT
Start: 2023-03-02 | End: 2023-03-04

## 2023-03-02 RX ORDER — AMPICILLIN TRIHYDRATE 250 MG
1 CAPSULE ORAL EVERY 4 HOURS
Refills: 0 | Status: DISCONTINUED | OUTPATIENT
Start: 2023-03-02 | End: 2023-03-02

## 2023-03-02 RX ORDER — KETOROLAC TROMETHAMINE 30 MG/ML
30 SYRINGE (ML) INJECTION ONCE
Refills: 0 | Status: DISCONTINUED | OUTPATIENT
Start: 2023-03-02 | End: 2023-03-04

## 2023-03-02 RX ORDER — DIBUCAINE 1 %
1 OINTMENT (GRAM) RECTAL EVERY 6 HOURS
Refills: 0 | Status: DISCONTINUED | OUTPATIENT
Start: 2023-03-02 | End: 2023-03-04

## 2023-03-02 RX ORDER — IBUPROFEN 200 MG
600 TABLET ORAL EVERY 6 HOURS
Refills: 0 | Status: COMPLETED | OUTPATIENT
Start: 2023-03-02 | End: 2024-01-29

## 2023-03-02 RX ORDER — TETANUS TOXOID, REDUCED DIPHTHERIA TOXOID AND ACELLULAR PERTUSSIS VACCINE, ADSORBED 5; 2.5; 8; 8; 2.5 [IU]/.5ML; [IU]/.5ML; UG/.5ML; UG/.5ML; UG/.5ML
0.5 SUSPENSION INTRAMUSCULAR ONCE
Refills: 0 | Status: DISCONTINUED | OUTPATIENT
Start: 2023-03-02 | End: 2023-03-04

## 2023-03-02 RX ORDER — OXYTOCIN 10 UNIT/ML
333.33 VIAL (ML) INJECTION
Qty: 20 | Refills: 0 | Status: DISCONTINUED | OUTPATIENT
Start: 2023-03-02 | End: 2023-03-02

## 2023-03-02 RX ORDER — AER TRAVELER 0.5 G/1
1 SOLUTION RECTAL; TOPICAL EVERY 4 HOURS
Refills: 0 | Status: DISCONTINUED | OUTPATIENT
Start: 2023-03-02 | End: 2023-03-04

## 2023-03-02 RX ORDER — LANOLIN
1 OINTMENT (GRAM) TOPICAL EVERY 6 HOURS
Refills: 0 | Status: DISCONTINUED | OUTPATIENT
Start: 2023-03-02 | End: 2023-03-04

## 2023-03-02 RX ORDER — HYDROCORTISONE 1 %
1 OINTMENT (GRAM) TOPICAL EVERY 6 HOURS
Refills: 0 | Status: DISCONTINUED | OUTPATIENT
Start: 2023-03-02 | End: 2023-03-04

## 2023-03-02 RX ORDER — SIMETHICONE 80 MG/1
80 TABLET, CHEWABLE ORAL EVERY 4 HOURS
Refills: 0 | Status: DISCONTINUED | OUTPATIENT
Start: 2023-03-02 | End: 2023-03-04

## 2023-03-02 RX ORDER — MAGNESIUM HYDROXIDE 400 MG/1
30 TABLET, CHEWABLE ORAL
Refills: 0 | Status: DISCONTINUED | OUTPATIENT
Start: 2023-03-02 | End: 2023-03-04

## 2023-03-02 RX ADMIN — Medication 2 MILLIUNIT(S)/MIN: at 14:32

## 2023-03-02 RX ADMIN — Medication 200 GRAM(S): at 14:44

## 2023-03-02 NOTE — OB RN PATIENT PROFILE - NSPRENATALGBS_OBGYN_ALL_OB_GET_DAYS
"    [facility]   Intensive Care Unit Daily Note    Name: \"[place]\"  (Male-Emperatriz Broussard)  Parents: Emperatriz Broussard and Data Unavailable  YOB: 2019    History of Present Illness   , appropriate for gestational age, Gestational Age: born at 24 weeks 5/7days, male infant born by STAT . Our team was asked by Dr. Samy Bruce of [facility] to care for this infant born at [facility].     Due to prematurity with free air noted on CXR on DOL 11, we were contacted to transport this infant to [facility]NICU for further evaluation and therapy (see transport note for details).     For details of outside hospital course, see the bottom of this note.    Patient Active Problem List   Diagnosis     Free intraperitoneal air     Prematurity, 750-999 grams, 25-26 completed weeks     Need for observation and evaluation of  for sepsis     Malnutrition (H)     IVH (intraventricular hemorrhage) (H)     Perforation bowel (H)     Patent ductus arteriosus        Interval History   No events       Assessment & Plan   Overall Status:  30 day old  ELBW male infant who is now 29w0d PMA.     This patient is critically ill with respiratory failure requiring mechanical ventilation support.      Vascular Access:  PIV  PAL out on   IR double lumen PICC 12/15    FEN:    Vitals:    19 0000 19 0000 19 0000   Weight: 1.01 kg (2 lb 3.6 oz) 1.05 kg (2 lb 5 oz) 1.06 kg (2 lb 5.4 oz)     Weight change: 0.04 kg (1.4 oz)  43% change from BW    Malnutrition.    Intake 133 ml/kg/d; 91 kcal/kg/d, UOP 3.3    - Started on MEN on ; 1 ml q4hr. Continue same for now until PDA coil.  - TPN (GIR 12, AA4)/SMOF(3).    - Restrict TF goal 140 ml/kg/day. Monitor fluid status and TPN labs.  - Follow electrolytes, TPN labs.  - Strict I/Os, daily weights   - Received a dose of Lasix on     Lab Results   Component Value Date    ALKPHOS " 766 2019     GI: Transferred for findings of free intra-abdominal air on XR, likely secondary to NEC. Now s/p exploratory laparotomy, resection of 16.5cm ileum and creation of ileostomy/mucous fistula on 12/10. Tolerated procedure well, and has remained hemodynamically stable.  - Surgery involved, follow recommendations     Renal: History of CAROL, potentially secondary to PDA; improving. Peak creatinine prior to transfer 1.83. Now clearing. Concern for possible renal vein thrombosis with pink-tinged urine and inability to visualize R renal vein at [facility]. Repeat renal ultrasound 12/11, 12/23 with patent renal veins bilaterally, but echogenic kidneys. Continue to follow Cr QMon/Thur. Continue Gent on renal dosing (q36h)    Creatinine   Date Value Ref Range Status   2019 0.81 (H) 0.15 - 0.53 mg/dL Final       - Repeat SUSANNA 1/23    Respiratory:  Ongoing failure secondary to prematurity and RDS. Received surfactant x 2 at outside hospital. Unintentional extubation 12/19, maintained on MORALES - CPAP until intubated on 12/27 for increasing WOB.    Now on SIMV mode of ventilation.  FiO2 (%): 28 %  Resp: 52  Ventilation Mode: SPCPS  Rate Set (breaths/minute): 45 breaths/min  PEEP (cm H2O): 8 cmH2O  Pressure Support (cm H2O): 10 cmH2O  Oxygen Concentration (%): 21 %  Inspiratory Pressure Set (cm H2O): 14 (PIP 22)  Inspiratory Time (seconds): 0.4 sec    - Follow VBG daily and PRN with clinical changes  - Follow CXR PRN and with clinical changes   - Continue CR monitoring  - Vitamin A for BPD ppx  - Diuril 20/kg/d    Apnea of Prematurity:  No/Minimal ABDS.   - Continue caffeine until ~33-34 weeks PMA.       Cardiovascular:  Currently hemodynamically stable, not requiring pressors.    Echo obtained 12/7 with findings of stretched PFO vs ASD, small mid-muscular VSD and moderate PDA.  - Continue CR monitoring.    >PDA: Noted at outside hospital, previously described as moderate. Tylenol 12/7- 12/16. Echo  12/17- moderate PDA with run-off. Follow-up echos 12/22, 24, 26 no change in PDA.      PDA coil 12/31.    Endocrine: Suspected adrenal insufficiency, loaded with hydrocortisone and continued on maintenance at outside hospital. Weaned to off 12/24.   - Prior to surgery, stress dosed with hydrocortisone.    ID:  Blood culture positive 12/10 for ESBL Klebsiella in the context of Necrotizing enterocolitis. Repeat culture 12/11-12/17 also positive. -LP 12/12 - bloody tap (history of IVH). Peritoneal culture growing multiple bacteria: E.Coli, Klebsiella, Enterococcus and Proteus. Blood culture at [facility] growing E.coli per discussion with NNP 12/13. Antibiotics (Vanc/Ceftaz) started 12/10 prior to transfer. Broadened to include Flagyl and Micafungin on transfer to [facility]. CRP markedly elevated: 134->141->185--> 13.3 on 12/25  - Currently on Amp (changed to meningitic dose on 12/24) and Meropenem meningitic dose for 21 days after the first neg culture (through 1/9)  - Added Gentamycin for synergy (12/21)    Thrombus/ vegetation on PICC tip in IVC on 12/26. No vegetations on cardiac valves.    - MRSA swab weekly q Sunday    S/P Johanne (discontinued 12/17). (Previously broadened to Meropenem 12/11 for ESBL Klebsiella). Flagyl discontinued on 12/12.  - Follow up 12/19, 12/20, 12/21 BCx- neg thus far.    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 Non-occlusive, chronic noted 12/21  Left proximal femoral vein and superficial femoral- non-occlusive, noted 12/21, stable 12/26, 12/29  >>Left external iliac- new, non-occlusive noted 12/26  Right internal jugular and subclavian- filling defect, non-occlusive noted 12/21, stable 12/24. R internal jugular clot not seen 12/26 but subclavian present. Stable 12/29.   Thrombus/ vegetation on PICC tip in IVC on echo 12/26    - Hematology consulted 12/21: holding on anticoagulation given b/l IVH (g4) after discussion with neurosurgery.  - Repeat aorta/  IVC/ right upper extremity, left lower extremity ultrasounds 1/6  - Repeat echo 12/30 to visualize PICC tip prior to procedure.     Hematology:    > Risk for anemia of prematurity and phlebotomy.    - plan for iron supplementation when tolerating full feeds.  - Monitor serial hemoglobin levels. Next 12/23.  - Transfuse as needed w goal Hgb >10. Last transfusion 12/23.     Recent Labs   Lab 12/26/19  0550 12/23/19  0610   HGB 11.4 11.1     >Coagulopathy: S/p FFP x 2 intraoperatively. Follow clinically.     >Thrombocytopenia: Improving, last PLT count 130 on 12/23. Urine CMV negative.   - Follow-up weekly.    Hyperbilirubinemia: Physiologic, Phototherapy not indicated.   - Monitor serial bilirubin levels. DB 4.3 on 12/20 (up-trending, AST/ALT normal).   - Abd U/S: Limited abdominal ultrasound was performed. No abscess or free fluid is identified. Biliary sludge without abnormal gallbladder wall thickening. Also obtained given persistent positive blood cultures to evaluate for abscess formation.     Recent Labs   Lab Test 12/27/19  0550 12/20/19  0526 12/16/19  0536 12/11/19  0545 12/10/19  1800   BILITOTAL 7.9* 4.3* 3.8 1.8 1.7   DBIL 6.9* 4.1* 3.2* 1.1* 1.2*       CNS:  History of Bilateral Gr IV IVH with moderate ventriculomegaly.  Increased PHH 12/16.   - Repeat ultrasound 12/11 with similar findings to outside US.   - Daily OFC-stable/weekly HUS (next 12/30)   - Given ventriculomegaly, involved neurosurgery 12/16- plan to continue daily OFC (increasing daily) and weekly (qMon) HUS- Obtained 12/28- increasing lateral ventricle size. Likely will need VAD in next 1-2 weeks.  Will discuss with NSG 12/30.     HUS 12/11: Bilateral intraventricular hemorrhages with mild to moderate lateral and third ventriculomegaly. Small amount of abnormal periventricular white matter echogenicity compatible with bilateral grade 4 germinal matrix hemorrhages. Suspect small intraparenchymal hemorrhage in the periphery of the left  occipital lobe.     Sedation/ Pain Control:  -Non pharmacological measures.    ROP:  At risk due to prematurity. First exam scheduled with Peds Ophthalmology ~.    Thermoregulation: Stable with current support.   - Continue to monitor temperature and provide thermal support as indicated.    HCM:   - [place] are still [place].   - Repeat NMS at 14 (sent) & 30 days old.  - Obtain hearing/CCHD screens PTD.  - Obtain carseat trial PTD.  - Continue standard NICU cares and family education plan.    Immunizations   BW too low for Hep B immunization at <24 hr.  - give Hep B immunization w 2 mo immunizations  .There is no immunization history for the selected administration types on file for this patient.     Medications   Current Facility-Administered Medications   Medication     ampicillin 75 mg in NS injection PEDS/NICU     Breast Milk label for barcode scanning 1 Bottle     caffeine citrate (CAFCIT) injection 8 mg     chlorothiazide (DIURIL) 10 mg in sterile water (preservative free) injection     cyclopentolate-phenylephrine (CYCLOMYDRYL) 0.2-1 % ophthalmic solution 1 drop     fentaNYL DILUTE 10 mcg/mL (SUBLIMAZE) PEDS/NICU injection 0.46 mcg     fluconazole (DIFLUCAN) PEDS/NICU injection 6 mg     gentamicin (PF) (GARAMYCIN) injection NICU 1.6 mg     [START ON 2020] hepatitis b vaccine recombinant (ENGERIX-B) injection 10 mcg     lipids 4 oil (SMOFLIPID) 20% for neonates (Daily dose divided into 2 doses - each infused over 10 hours)     LORazepam (ATIVAN) injection 0.046 mg     meropenem (MERREM) 35 mg in sodium chloride 0.9 % injection PEDS/NICU     NaCl 0.45 % with heparin 0.5 Units/mL infusion     naloxone (NARCAN) injection 0.008 mg     parenteral nutrition -  compounded formula     sodium chloride (PF) 0.9% PF flush 0.2-10 mL     sodium chloride (PF) 0.9% PF flush 1 mL     sucrose (SWEET-EASE) solution 0.2-2 mL     tetracaine (PONTOCAINE) 0.5 %  "ophthalmic solution 1 drop     vitamin A 50,000 units/mL (15,000 mcg/mL) injection 5,000 Units        Physical Exam - Attending Physician    BP 63/23   Pulse 160   Temp 99.4  F (37.4  C) (Axillary)   Resp 52   Ht 0.32 m (1' 0.6\")   Wt 1.06 kg (2 lb 5.4 oz)   HC 24.2 cm (9.53\")   SpO2 92%   BMI 10.35 kg/m     GENERAL: Not in distress. RESPIRATORY: Normal breath sounds bilaterally. CVS: Normal heart tones. Murmur present.   ABDOMEN: Soft, ostomies pink. CNS: Ant fontanel level. Tone normal for gestational age.      Communications   Parents:  Updated after rounds.     PCPs:   Infant PCP: Physician No Ref-Primary  Delivering Provider: Javier Altman  Referring: Samy Bruce MD at [facility]   Admission note routed to all; Dr. Bruce updated via telephone 12/12; NNP 12/13.     Health Care Team:  Patient discussed with the care team.    A/P, imaging studies, laboratory data, medications and family situation reviewed.    Adia Meza MD    History prior to transfer to [facility]:  Baby transported on DOL 11 for free air.   FEN: Had been on 4ml q3h feeds with TPN/IL. Had early hyperglycemia requiring insulin x4 days.  Renal: Elevated Creatine of 1.85, renal ultrasound obtained on day of transfer.  Respiratory: Intubated in DR, Curosurf given x2. SIMV Rate 60, CG 5.3ml/kg, PEEP 5, PS 8.  CV: History of dopamine needs for first 4 days. Stress dosing hct had been given and was transferred on 0.5mg/kg/day. He did not receive prophylactic indocin. Echo on 12/7/19 showed moderate PDA with mostly left to right shunting. There was a small muscular VSD and small ASD/PFO. He was started on IV tylenol on 12/7.  ID: Concern for culture negative sepsis after birth, Amp and Gent given x1week. Was on Flucon PPX.  GI: Phototherapy stopped on 12/6/19  Skin: Had a right distal leg PIV infiltrate, hydrogel to wound  Neuro: He had HUS x3 most recently showing small bilateral grade IV's IVH on 12/6. Baby had increased " BP spikes on DOL 4 and was loaded x1 with Phenobarbital.   Heme: Was transfused with PRBC's x5, most recently on 12/9. Plt count 93k down from 169k on day of transferred. He was transfused given he became pre op.    Access: History of UAC (removed 12/7/19) and UVC (removed 12/6/19). PICC line placed 12/6/19   22

## 2023-03-02 NOTE — OB PROVIDER H&P - NS_OBGYNHISTORY_OBGYN_ALL_OB_FT
POBHx:    @ 39 wks. gest., 7#1    PGYnHx:  Infertility due to Endometriosis Dx'ed on LSC at time of Ovarian Cystectomy.  This & previous pregnancy were IVF

## 2023-03-02 NOTE — OB RN DELIVERY SUMMARY - NS_SEPSISRSKCALC_OBGYN_ALL_OB_FT
Dr Whiteside
EOS calculated successfully. EOS Risk Factor: 0.5/1000 live births ([facility] national incidence); GA=39w5d; Temp=98.6; ROM=20.467; GBS='Negative'; Antibiotics='GBS specific antibiotics > 2 hrs prior to birth'

## 2023-03-02 NOTE — OB PROVIDER DELIVERY SUMMARY - NSMATERNALFETALCONCERNS_OBGYN_ALL_OB_FT
Fetal Alert  1.3.23:Hx of PAC&#x27;s and blocked atrial contractions. Fetal echo done 22-normal fetal rhythm and CA interval at 120 msec noted. ECG to be done in  nursery.Ambulatory pediatric cardiology consultation to be scheduled 4-6 weeks after birth.Camila Bro RN.

## 2023-03-02 NOTE — OB PROVIDER H&P - HISTORY OF PRESENT ILLNESS
32 y.o.  Female  EDC 3/4/23 IUP @ 39 5/7 wks. gest., (-) GBS, who presents from PMD office where she was ruled in for PROM (clear) @ 11:30 p.m.  Pt. admits to cramping.  (-) Vaginal Bleeding.  (+) FM.  FA:  fetal PAC's to be f/u'ed after delivery.  Fetal Echo was WNL.  This is an IVF pregnancy.  Pt. had COVID during 1st trimester.  EFW 3600 gms.    POBHx:    @ 39 wks. gest., 7#1    PGYnHx:  Infertility due to Endometriosis Dx'ed on LSC at time of Ovarian Cystectomy.  This & previous pregnancy were IVF    PMHx:   Spontaneous Pneumothorax s/p Chest Tube    PSHx;  2012 Chest Tube Placement            2017 Laparoscopic Ovarian Cystectomy    Meds:  PNV 1 p.o. daily    NKDA  Intolerance:  Codeine-nausea/vomiting    SocHx:  Denies smoking tobacco/ETOH/Illegal drug use    FHx:  Non-contibutory

## 2023-03-02 NOTE — OB PROVIDER H&P - NSHPPHYSICALEXAM_GEN_ALL_CORE
Heart:  NSR  Lungs: Wilmer. Clear  Abd:  soft, NT, gravid uterus Heart:  NSR  Lungs: Wilmer. Clear  Abd:  soft, NT, gravid uterus    SONO:  Vtx

## 2023-03-02 NOTE — OB RN DELIVERY SUMMARY - NSSELHIDDEN_OBGYN_ALL_OB_FT
[NS_DeliveryAttending1_OBGYN_ALL_OB_FT:[email or web address]],[NS_DeliveryAssist1_OBGYN_ALL_OB_FT:[email or web address]=],[NS_DeliveryRN_OBGYN_ALL_OB_FT:[id number]=],[NS_CirculateRN2_OBGYN_ALL_OB_FT:[id number]=]

## 2023-03-02 NOTE — OB PROVIDER DELIVERY SUMMARY - NSSELHIDDEN_OBGYN_ALL_OB_FT
[NS_DeliveryAttending1_OBGYN_ALL_OB_FT:[email or web address]],[NS_DeliveryAssist1_OBGYN_ALL_OB_FT:[email or web address]=]

## 2023-03-02 NOTE — OB PROVIDER H&P - NSMATERNALFETALCONCERNS_OBGYN_ALL_OB_FT
Fetal Alert  1.3.23:Hx of PAC&#x27;s and blocked atrial contractions. Fetal echo done 22-normal fetal rhythm and DC interval at 120 msec noted. ECG to be done in  nursery.Ambulatory pediatric cardiology consultation to be scheduled 4-6 weeks after birth.Camila Bro RN.

## 2023-03-02 NOTE — OB RN PATIENT PROFILE - NSMATERNALFETALCONCERNS_OBGYN_ALL_OB_FT
Fetal Alert  1.3.23:Hx of PAC&#x27;s and blocked atrial contractions. Fetal echo done 22-normal fetal rhythm and MT interval at 120 msec noted. ECG to be done in  nursery.Ambulatory pediatric cardiology consultation to be scheduled 4-6 weeks after birth.Camila Bro RN.

## 2023-03-02 NOTE — OB PROVIDER H&P - NSOBPROC_OBGYN_ALL_OB
"       HPI:     Med Fairchild is a 13 day old full term infant born by vaginal delivery who presents with left eye discharge.     Med Fairchild is accompanied by his mom.    Left eye has been having discharge all day long. It's the worst in the morning and she has to clean it.     This has been occurring x 5 days.     Did receive erythromycin eye drops in the hospital.     Sclera does not look red.     Mom breastfeeds then feeds formula after breastfeeding. He sometimes spits up after formula (non projectile).    Mom denies any complications of pregnancy other than needing induction. She has no history of STI and is not concerned about STI from her .    A  was used for this visit.          PMHX:     Patient Active Problem List   Diagnosis     Term , current hospitalization       No current outpatient medications on file.       Social History     Tobacco Use     Smoking status: None   Substance Use Topics     Alcohol use: None     Drug use: None       Social History     Social History Narrative     Not on file       No Known Allergies    No results found for this or any previous visit (from the past 24 hour(s)).         Review of Systems:   7 point ROS negative except as noted.           Physical Exam:     Vitals:    19 1610   Pulse: 137   Resp: 24   Temp: 97.5  F (36.4  C)   TempSrc: Tympanic   SpO2: 99%   Weight: 3.615 kg (7 lb 15.5 oz)   Height: 0.54 m (1' 9.25\")     Body mass index is 12.41 kg/m .    General: Alert, well-appearing male in NAD, sleeping in mom's arms.  HEENT: PERRL, sclera white, and there is a yellow discharge at the left eye with crusting. moist oral mucus membranes  Pulm: CTA BL, no tachypnea  CV: RRR, no murmur  Abd: soft, NTND, no masses  Ext: Warm, well perfused  Skin: No rash on limited skin exam        Assessment and Plan     1. Acute bacterial conjunctivitis of left eye  Since we are still in the  period, I worry " about chlamydia or gonorrheal infection of the eye. Will treat with erythromycin and test for GC/chlamydia. Sample obtained from eye discharge.  - erythromycin (ROMYCIN) 5 MG/GM ophthalmic ointment; Place 0.5 inches Into the left eye 2 times daily for 7 days  Dispense: 1 g; Refill: 0  - Chlamydia trachomatis PCR  - Chlamydiatrach Jana ([facility])  - Neisseria Gonorrhoeae by PCR,Non-Genital ([facility])    2. Spit up  Discussed with mom that jesusita Jovel does not need the formula that he's getting after breastfeeding since he's spitting up after that. I encouraged her that for most babies breastmilk is enough. He has surpassed his birthweight which is a great indication that he's getting plenty of calories.      There are no discontinued medications.    Options for treatment and follow-up care were reviewed with the patient and/or guardian. Med Fairchild and/or guardian engaged in the decision making process and verbalized understanding of the options discussed and agreed with the final plan.    Jennifer Alejo MD  [facility]   Pager: [phone]     None Done

## 2023-03-02 NOTE — OB RN DELIVERY SUMMARY - NSMATERNALFETALCONCERNS_OBGYN_ALL_OB_FT
Fetal Alert  1.3.23:Hx of PAC&#x27;s and blocked atrial contractions. Fetal echo done 22-normal fetal rhythm and AR interval at 120 msec noted. ECG to be done in  nursery.Ambulatory pediatric cardiology consultation to be scheduled 4-6 weeks after birth.Camila Bro RN.

## 2023-03-02 NOTE — OB PROVIDER H&P - PROBLEM SELECTOR PLAN 1
Admit to L&D  NPO  IVF  BR  EFM/TOCO  Anesthesia consult  Routine labs  Pitocin IOL  Ampicilling per pt.'d request.    IDALIA Merlos  D/W Dr. Fajardo

## 2023-03-02 NOTE — OB PROVIDER DELIVERY SUMMARY - NSPROVIDERDELIVERYNOTE_OBGYN_ALL_OB_FT
JESSICA NL FEMALE INFANT  DELAYED CORD CLAMPING X 30 SECS  APGARS 9/9  PLACENTA SPONT INTACT---UTERUS EXPLORED AND EMPTY  2ND DEG AND LEFT CERVICAL LAC REPAIRED   CC'S  PT TO RR IN GOOD CONDITION  JEANINE ARELLANO MD

## 2023-03-02 NOTE — OB PROVIDER DELIVERY SUMMARY - NSLOWPPHRISK_OBGYN_A_OB
No previous uterine incision/Dominguez Pregnancy/Less than or equal to 4 previous vaginal births/No known bleeding disorder/No history of postpartum hemorrhage/No other PPH risks indicated

## 2023-03-02 NOTE — OB PROVIDER LABOR PROGRESS NOTE - ASSESSMENT
attg note   cx 3/90/-2  fhr cat 1  arm residual bag----clear  pit at 2  for epidural  t pearl melvin

## 2023-03-02 NOTE — OB RN DELIVERY SUMMARY - NS_DELBLDTRANSFUS_OBGYN_ALL_OB
Problem: Fall Injury Risk  Goal: Absence of Fall and Fall-Related Injury  Intervention: Identify and Manage Contributors to Fall Injury Risk  Flowsheets (Taken 6/14/2020 0416)  Self-Care Promotion: independence encouraged  Medication Review/Management: medications reviewed  Intervention: Promote Injury-Free Environment  Flowsheets (Taken 6/14/2020 0416)  Safety Promotion/Fall Prevention:   bed alarm set   side rails raised x 2   room near unit station  Environmental Safety Modification:   clutter free environment maintained   lighting adjusted   room near unit station   room organization consistent     Problem: Skin Injury Risk Increased  Goal: Skin Health and Integrity  Intervention: Optimize Skin Protection  Flowsheets (Taken 6/14/2020 0416)  Pressure Reduction Devices:   pressure-redistributing mattress utilized   positioning supports utilized  Head of Bed (HOB): HOB at 30-45 degrees     Problem: Adult Inpatient Plan of Care  Goal: Plan of Care Review  Flowsheets (Taken 6/14/2020 0416)  Plan of Care Reviewed With: patient  Goal: Absence of Hospital-Acquired Illness or Injury  Intervention: Identify and Manage Fall Risk  Flowsheets (Taken 6/14/2020 0416)  Safety Promotion/Fall Prevention:   bed alarm set   side rails raised x 2   room near unit station  Intervention: Prevent VTE (venous thromboembolism)  Flowsheets (Taken 6/14/2020 0416)  VTE Prevention/Management: ROM (active) performed  Goal: Optimal Comfort and Wellbeing  Intervention: Provide Person-Centered Care  Flowsheets (Taken 6/14/2020 0416)  Trust Relationship/Rapport: thoughts/feelings acknowledged     Problem: Wound  Goal: Optimal Wound Healing  Intervention: Promote Effective Wound Healing  Flowsheets (Taken 6/14/2020 0416)  Sleep/Rest Enhancement: regular sleep/rest pattern promoted  Pain Management Interventions: pain management plan reviewed with patient/caregiver     Problem: Diabetes Comorbidity  Goal: Blood Glucose Level Within  Desired Range  Intervention: Maintain Glycemic Control  Flowsheets (Taken 6/14/2020 8896)  Glycemic Management:   blood glucose monitoring   supplemental insulin given     Problem: Infection  Goal: Infection Symptom Resolution  Intervention: Prevent or Manage Infection  Flowsheets (Taken 6/14/2020 9376)  Infection Management:   aseptic technique maintained   cultures obtained and sent to lab     Patient remained free of injury throughout the shift. Vital signs remained WNL. Complaints of a headache treated with prn tylenol with relief noted. Plan to continue IV antibiotics, pain management, monitor and manage blood glucose level, and await further recommendations from the consulted providers. Patient updated on plan of care and verbalized understanding. Patient stable and will continue to be monitored.    No

## 2023-03-03 ENCOUNTER — TRANSCRIPTION ENCOUNTER (OUTPATIENT)
Age: 33
End: 2023-03-03

## 2023-03-03 LAB
HCT VFR BLD CALC: 28 % — LOW (ref 34.5–45)
HGB BLD-MCNC: 9.1 G/DL — LOW (ref 11.5–15.5)
KLEIHAUER-BETKE CALCULATION: 0 % — SIGNIFICANT CHANGE UP (ref 0–0.3)
T PALLIDUM AB TITR SER: NEGATIVE — SIGNIFICANT CHANGE UP

## 2023-03-03 RX ORDER — IBUPROFEN 200 MG
1 TABLET ORAL
Qty: 0 | Refills: 0 | DISCHARGE
Start: 2023-03-03

## 2023-03-03 RX ORDER — ASCORBIC ACID 60 MG
500 TABLET,CHEWABLE ORAL THREE TIMES A DAY
Refills: 0 | Status: DISCONTINUED | OUTPATIENT
Start: 2023-03-03 | End: 2023-03-04

## 2023-03-03 RX ORDER — FERROUS SULFATE 325(65) MG
325 TABLET ORAL THREE TIMES A DAY
Refills: 0 | Status: DISCONTINUED | OUTPATIENT
Start: 2023-03-03 | End: 2023-03-04

## 2023-03-03 RX ORDER — IBUPROFEN 200 MG
600 TABLET ORAL EVERY 6 HOURS
Refills: 0 | Status: DISCONTINUED | OUTPATIENT
Start: 2023-03-03 | End: 2023-03-04

## 2023-03-03 RX ORDER — ACETAMINOPHEN 500 MG
3 TABLET ORAL
Qty: 0 | Refills: 0 | DISCHARGE
Start: 2023-03-03

## 2023-03-03 RX ORDER — FERROUS SULFATE 325(65) MG
1 TABLET ORAL
Qty: 0 | Refills: 0 | DISCHARGE
Start: 2023-03-03

## 2023-03-03 RX ADMIN — Medication 975 MILLIGRAM(S): at 15:10

## 2023-03-03 RX ADMIN — Medication 975 MILLIGRAM(S): at 21:45

## 2023-03-03 RX ADMIN — Medication 500 MILLIGRAM(S): at 12:03

## 2023-03-03 RX ADMIN — Medication 500 MILLIGRAM(S): at 20:51

## 2023-03-03 RX ADMIN — SODIUM CHLORIDE 3 MILLILITER(S): 9 INJECTION INTRAMUSCULAR; INTRAVENOUS; SUBCUTANEOUS at 06:11

## 2023-03-03 RX ADMIN — Medication 975 MILLIGRAM(S): at 01:30

## 2023-03-03 RX ADMIN — Medication 975 MILLIGRAM(S): at 11:58

## 2023-03-03 RX ADMIN — Medication 325 MILLIGRAM(S): at 20:51

## 2023-03-03 RX ADMIN — Medication 325 MILLIGRAM(S): at 12:03

## 2023-03-03 RX ADMIN — Medication 600 MILLIGRAM(S): at 15:05

## 2023-03-03 RX ADMIN — Medication 975 MILLIGRAM(S): at 00:30

## 2023-03-03 RX ADMIN — Medication 600 MILLIGRAM(S): at 06:48

## 2023-03-03 RX ADMIN — Medication 600 MILLIGRAM(S): at 06:12

## 2023-03-03 RX ADMIN — Medication 600 MILLIGRAM(S): at 17:33

## 2023-03-03 RX ADMIN — Medication 975 MILLIGRAM(S): at 17:32

## 2023-03-03 RX ADMIN — Medication 1 TABLET(S): at 12:03

## 2023-03-03 RX ADMIN — Medication 975 MILLIGRAM(S): at 08:43

## 2023-03-03 RX ADMIN — Medication 600 MILLIGRAM(S): at 12:03

## 2023-03-03 RX ADMIN — Medication 975 MILLIGRAM(S): at 20:46

## 2023-03-03 NOTE — DISCHARGE NOTE OB - NS MD DC FALL RISK RISK
For information on Fall & Injury Prevention, visit: [email or web address] OR  [email or web address] OR  https://www.cdc.gov/steadi/patient.html

## 2023-03-03 NOTE — DISCHARGE NOTE OB - MEDICATION SUMMARY - MEDICATIONS TO TAKE
I will START or STAY ON the medications listed below when I get home from the hospital:    acetaminophen 325 mg oral tablet  -- 3 tab(s) by mouth every 6 hours  -- Indication: For Pain    ibuprofen 600 mg oral tablet  -- 1 tab(s) by mouth every 6 hours  -- Indication: For Pain    Prenatal Multivitamins with Folic Acid 1 mg oral tablet  -- 1 tab(s) by mouth once a day  -- Indication: For vitamin    ferrous sulfate 325 mg (65 mg elemental iron) oral tablet  -- 1 tab(s) by mouth 3 times a day  -- Indication: For iron

## 2023-03-03 NOTE — CHART NOTE - NSCHARTNOTEFT_GEN_A_CORE
PA NOTE     Day 1              Vital Signs Last 24 Hrs  T(C): 36.9 (03 Mar 2023 05:10), Max: 37.3 (03 Mar 2023 00:10)  T(F): 98.4 (03 Mar 2023 05:10), Max: 99.2 (03 Mar 2023 00:10)  HR: 101 (03 Mar 2023 05:10) (65 - 110)  BP: 102/67 (03 Mar 2023 05:10) (91/50 - 128/61)  BP(mean): --  RR: 18 (03 Mar 2023 05:10) (18 - 18)  SpO2: 97% (03 Mar 2023 05:10) (87% - 100%)    Parameters below as of 03 Mar 2023 05:10  Patient On (Oxygen Delivery Method): room air                   9.1    x     )-----------( x        (  @ 07:24 )             28.0                13.0   8.49  )-----------( 177      ( 02 @ 15:34 )             39.9           Assessment: Anemia secondary to acute blood loss due to delivery    Plan:  - Ferrous Sulfate, Vitamin C supplementation.  - Monitor for signs/symptoms of anemia.   - Does not require transfusion at this time PA NOTE     Day 1              Vital Signs Last 24 Hrs  T(C): 36.9 (03 Mar 2023 05:10), Max: 37.3 (03 Mar 2023 00:10)  T(F): 98.4 (03 Mar 2023 05:10), Max: 99.2 (03 Mar 2023 00:10)  HR: 101 (03 Mar 2023 05:10) (65 - 110)  BP: 102/67 (03 Mar 2023 05:10) (91/50 - 128/61)  BP(mean): --  RR: 18 (03 Mar 2023 05:10) (18 - 18)  SpO2: 97% (03 Mar 2023 05:10) (87% - 100%)    Parameters below as of 03 Mar 2023 05:10  Patient On (Oxygen Delivery Method): room air                   9.1    x     )-----------( x        (  @ 07:24 )             28.0                13.0   8.49  )-----------( 177      ( 02 @ 15:34 )             39.9           Assessment: Anemia secondary to acute blood loss due to delivery    Plan:  - Ferrous Sulfate, Vitamin C supplementation.  - Monitor for signs/symptoms of anemia.   - Does not require transfusion at this time    Agree  with above  Patient with anemia due to acute blood loss  start iron  no indication for transfusion at this time    goran melvin

## 2023-03-03 NOTE — DISCHARGE NOTE OB - PATIENT PORTAL LINK FT
You can access the FollowMyHealth Patient Portal offered by [facility] by registering at the following website: [email or web address]. By joining [facility]’s FollowMyHealth portal, you will also be able to view your health information using other applications (apps) compatible with our system.

## 2023-03-03 NOTE — DISCHARGE NOTE OB - CARE PROVIDER_API CALL
Zoila Sinha)  [facility] Obstetrics and Gynecology  [address] [place] [address], [address]  [place] [address]  Phone: [phone]  Fax: [phone]  Follow Up Time:

## 2023-03-03 NOTE — PROGRESS NOTE ADULT - PROBLEM SELECTOR PLAN 1
#RH negative  check KB  for Rhogam    #Postpartum  Increase OOB  Regular diet  PO Pain protocol  Routine Postpartum Care #RH negative, Baby RH positive  check KB  for Rhogam    #Postpartum  Increase OOB  Regular diet  PO Pain protocol  Routine Postpartum Care

## 2023-03-03 NOTE — DISCHARGE NOTE OB - CARE PLAN
1 Principal Discharge DX:	 (normal spontaneous vaginal delivery)  Assessment and plan of treatment:	reg diet  ambulating  pain control

## 2023-03-04 VITALS
SYSTOLIC BLOOD PRESSURE: 98 MMHG | OXYGEN SATURATION: 99 % | RESPIRATION RATE: 18 BRPM | DIASTOLIC BLOOD PRESSURE: 65 MMHG | TEMPERATURE: 98 F | HEART RATE: 68 BPM

## 2023-03-04 LAB
COVID-19 SPIKE DOMAIN AB INTERP: POSITIVE
COVID-19 SPIKE DOMAIN ANTIBODY RESULT: >250 U/ML — HIGH
SARS-COV-2 IGG+IGM SERPL QL IA: >250 U/ML — HIGH
SARS-COV-2 IGG+IGM SERPL QL IA: POSITIVE

## 2023-03-04 PROCEDURE — 85025 COMPLETE CBC W/AUTO DIFF WBC: CPT

## 2023-03-04 PROCEDURE — 59050 FETAL MONITOR W/REPORT: CPT

## 2023-03-04 PROCEDURE — 87635 SARS-COV-2 COVID-19 AMP PRB: CPT

## 2023-03-04 PROCEDURE — 86901 BLOOD TYPING SEROLOGIC RH(D): CPT

## 2023-03-04 PROCEDURE — 85014 HEMATOCRIT: CPT

## 2023-03-04 PROCEDURE — 86780 TREPONEMA PALLIDUM: CPT

## 2023-03-04 PROCEDURE — 85460 HEMOGLOBIN FETAL: CPT

## 2023-03-04 PROCEDURE — 86850 RBC ANTIBODY SCREEN: CPT

## 2023-03-04 PROCEDURE — 85018 HEMOGLOBIN: CPT

## 2023-03-04 PROCEDURE — 86769 SARS-COV-2 COVID-19 ANTIBODY: CPT

## 2023-03-04 PROCEDURE — 86900 BLOOD TYPING SEROLOGIC ABO: CPT

## 2023-03-04 RX ADMIN — Medication 600 MILLIGRAM(S): at 06:25

## 2023-03-04 RX ADMIN — Medication 600 MILLIGRAM(S): at 12:20

## 2023-03-04 RX ADMIN — Medication 600 MILLIGRAM(S): at 05:26

## 2023-03-04 RX ADMIN — Medication 600 MILLIGRAM(S): at 11:50

## 2023-03-04 RX ADMIN — Medication 975 MILLIGRAM(S): at 09:12

## 2023-03-04 RX ADMIN — Medication 325 MILLIGRAM(S): at 05:26

## 2023-03-04 RX ADMIN — Medication 975 MILLIGRAM(S): at 08:42

## 2023-03-04 RX ADMIN — Medication 600 MILLIGRAM(S): at 00:35

## 2023-03-04 RX ADMIN — Medication 1 TABLET(S): at 11:50

## 2023-03-04 RX ADMIN — Medication 975 MILLIGRAM(S): at 04:20

## 2023-03-04 RX ADMIN — Medication 975 MILLIGRAM(S): at 03:20

## 2023-03-04 RX ADMIN — Medication 600 MILLIGRAM(S): at 01:35

## 2023-03-04 RX ADMIN — Medication 500 MILLIGRAM(S): at 05:26

## 2023-03-04 NOTE — PROGRESS NOTE ADULT - SUBJECTIVE AND OBJECTIVE BOX
Postpartum Note- PPD#1    Prenatal Labs  Blood type: A Negative  Rubella IgG: Immune  RPR: Negative        S:Patient c/o perineal discomfort.    Pt is OOB, tolerating PO, voiding. Lochia WNL.     O:  Vital Signs Last 24 Hrs  T(C): 36.9 (03 Mar 2023 05:10), Max: 37.3 (03 Mar 2023 00:10)  T(F): 98.4 (03 Mar 2023 05:10), Max: 99.2 (03 Mar 2023 00:10)  HR: 101 (03 Mar 2023 05:10) (65 - 110)  BP: 102/67 (03 Mar 2023 05:10) (91/50 - 128/61)  BP(mean): --  RR: 18 (03 Mar 2023 05:10) (18 - 18)  SpO2: 97% (03 Mar 2023 05:10) (87% - 100%)    Parameters below as of 03 Mar 2023 05:10  Patient On (Oxygen Delivery Method): room air         Gen: NAD  Abdomen: Soft, nontender, non-distended, fundus firm.  Vaginal: Lochia WNL    Ext:  Neg calf tenderness    LABS:    Hemoglobin: 13.0 g/dL (03-02 @ 15:34)      Hematocrit: 39.9 % (03-02 @ 15:34)                
S: Patient is doing well without complaints. Tolerates regular diet. She states lochia is in WNL. Ambulating without difficulty. Denies N/V. Voiding freely. Passing flatus. Pain well controlled with oral pain medications. Denies any HA/vision changes, CP/SOB, F/C/S.    O: Vital Signs Last 24 Hrs  T(C): 36.5 (04 Mar 2023 05:05), Max: 37.2 (03 Mar 2023 13:12)  T(F): 97.7 (04 Mar 2023 05:05), Max: 98.9 (03 Mar 2023 13:12)  HR: 68 (04 Mar 2023 05:05) (68 - 86)  BP: 98/65 (04 Mar 2023 05:05) (98/65 - 120/76)  BP(mean): --  RR: 18 (04 Mar 2023 05:05) (17 - 18)  SpO2: 99% (04 Mar 2023 05:05) (98% - 99%)    Parameters below as of 04 Mar 2023 05:05  Patient On (Oxygen Delivery Method): room air        Physical Exam:  General: NAD  Abdomen: soft, non-tender, non-distended, fundus firm  Vaginal: deferred  Ext: NTBL    Labs:             9.1    x     )-----------( x        ( 03-03 @ 07:24 )             28.0                   MEDICATIONS  (STANDING):  acetaminophen     Tablet .. 975 milliGRAM(s) Oral <User Schedule>  ascorbic acid 500 milliGRAM(s) Oral three times a day  diphtheria/tetanus/pertussis (acellular) Vaccine (Adacel) 0.5 milliLiter(s) IntraMuscular once  ferrous    sulfate 325 milliGRAM(s) Oral three times a day  ibuprofen  Tablet. 600 milliGRAM(s) Oral every 6 hours  ketorolac   Injectable 30 milliGRAM(s) IV Push once  ketorolac   Injectable 15 milliGRAM(s) IV Push once  measles/mumps/rubella Vaccine 0.5 milliLiter(s) SubCutaneous once  oxytocin Infusion 41.667 milliUNIT(s)/Min (125 mL/Hr) IV Continuous <Continuous>  prenatal multivitamin 1 Tablet(s) Oral daily  sodium chloride 0.9% lock flush 3 milliLiter(s) IV Push every 8 hours

## 2023-03-04 NOTE — PROGRESS NOTE ADULT - ASSESSMENT
A/P:  32y  PPD # 1   S/P  with 2nd degree and cervical laceration  EBL 400ml  Doing Well
s/p  uncomplicated pp course

## 2023-12-10 NOTE — OB PROVIDER H&P - ASSESSMENT
32 y.o.  Female  EDC 3/4/23 IUP @ 39 5/7 wks. gest., (-) GBS, who presents from PMD office where she was ruled in for PROM (clear) @ 11:30 p.m.  for IOL with Pitocin.    FA:  fetal PVC's to be f/u'ed after delivery.  Fetal Echo was WNL.  This is an IVF pregnancy.  Pt. had COVID during 1st trimester.  Pt. requesting Ampicilling for GBS prophylaxis despite being negative as previous pregnancy she was positivie & baby not affected.  EFW 3600 gms.
chem 8, mg, phos, H/H

## 2024-01-21 NOTE — DISCHARGE NOTE OB - CARE PROVIDER_API CALL
No
Zoila Sinha)  [facility] Obstetrics and Gynecology  [address] [place] [address], [address]  [place] [address]  Phone: [phone]  Fax: [phone]  Follow Up Time:

## 2024-04-16 NOTE — ED ADULT TRIAGE NOTE - TEMPERATURE IN CELSIUS (DEGREES C)
Specialty Pharmacy Refill Coordination Note     Bev is a 44 y.o. female contacted today regarding refills of Emgality and Botox specialty medication(s).Patient is due for injection on 04/23.    Reviewed and verified with patient:       Specialty medication(s) and dose(s) confirmed: yes    Refill Questions      Flowsheet Row Most Recent Value   Changes to allergies? No   Changes to medications? No   New conditions or infections since last clinic visit No   Unplanned office visit, urgent care, ED, or hospital admission in the last 4 weeks  No   How does patient/caregiver feel medication is working? Good   Financial problems or insurance changes  No   Since the previous refill, were any specialty medication doses or scheduled injections missed or delayed?  No   If yes, please provide the amount N/A   Why were doses missed? N/A   Does this patient require a clinical escalation to a pharmacist? No            Delivery Questions      Flowsheet Row Most Recent Value   Delivery method [employer]   Delivery address verified with patient/caregiver? Yes   Delivery address Home   Number of medications in delivery 2   Medication(s) being filled and delivered Galcanezumab-Gnlm, Onabotulinumtoxina   Doses left of specialty medications N/A   Copay verified? Yes   Copay amount N/A   Copay form of payment No copayment ($0)                   Follow-up: 28 day(s)     Boris Babin  Specialty Pharmacy Technician         38.9

## 2024-04-25 ENCOUNTER — RESULT REVIEW (OUTPATIENT)
Age: 34
End: 2024-04-25

## 2024-04-25 ENCOUNTER — APPOINTMENT (OUTPATIENT)
Dept: OBGYN | Facility: CLINIC | Age: 34
End: 2024-04-25
Payer: COMMERCIAL

## 2024-04-25 PROCEDURE — 99395 PREV VISIT EST AGE 18-39: CPT

## 2024-04-25 PROCEDURE — 96127 BRIEF EMOTIONAL/BEHAV ASSMT: CPT

## 2024-05-30 NOTE — OB RN PATIENT PROFILE - EDUCATION OF PARENTS ON THE BENEFITS OF SKIN TO SKIN AND BREASTFEEDING TO BOTH MOTHER AND INFANT.
[Dear  ___] : Dear  [unfilled], [Consult Letter:] : I had the pleasure of evaluating your patient, [unfilled]. [Please see my note below.] : Please see my note below. [Consult Closing:] : Thank you very much for allowing me to participate in the care of this patient.  If you have any questions, please do not hesitate to contact me. [Sincerely,] : Sincerely, [FreeTextEntry3] : Radha Brownlee MD [facility] Physician [facility] Division of Pediatric Endocrinology  Statement Selected

## 2025-02-26 ENCOUNTER — RESULT REVIEW (OUTPATIENT)
Age: 35
End: 2025-02-26

## 2025-02-26 ENCOUNTER — APPOINTMENT (OUTPATIENT)
Dept: OBGYN | Facility: CLINIC | Age: 35
End: 2025-02-26
Payer: COMMERCIAL

## 2025-02-26 PROCEDURE — 96127 BRIEF EMOTIONAL/BEHAV ASSMT: CPT

## 2025-02-26 PROCEDURE — 99395 PREV VISIT EST AGE 18-39: CPT

## 2025-02-26 PROCEDURE — 99212 OFFICE O/P EST SF 10 MIN: CPT | Mod: 25

## 2025-03-06 ENCOUNTER — RESULT REVIEW (OUTPATIENT)
Age: 35
End: 2025-03-06

## 2025-03-06 ENCOUNTER — OUTPATIENT (OUTPATIENT)
Dept: OUTPATIENT SERVICES | Facility: HOSPITAL | Age: 35
LOS: 1 days | End: 2025-03-06
Payer: COMMERCIAL

## 2025-03-06 ENCOUNTER — APPOINTMENT (OUTPATIENT)
Dept: ULTRASOUND IMAGING | Facility: CLINIC | Age: 35
End: 2025-03-06
Payer: COMMERCIAL

## 2025-03-06 ENCOUNTER — APPOINTMENT (OUTPATIENT)
Dept: MAMMOGRAPHY | Facility: CLINIC | Age: 35
End: 2025-03-06
Payer: COMMERCIAL

## 2025-03-06 DIAGNOSIS — N63.0 UNSPECIFIED LUMP IN UNSPECIFIED BREAST: ICD-10-CM

## 2025-03-06 DIAGNOSIS — Z98.890 OTHER SPECIFIED POSTPROCEDURAL STATES: Chronic | ICD-10-CM

## 2025-03-06 DIAGNOSIS — Z00.8 ENCOUNTER FOR OTHER GENERAL EXAMINATION: ICD-10-CM

## 2025-03-06 PROCEDURE — G0279: CPT | Mod: 26

## 2025-03-06 PROCEDURE — G0279: CPT

## 2025-03-06 PROCEDURE — 76641 ULTRASOUND BREAST COMPLETE: CPT | Mod: 26,50

## 2025-03-06 PROCEDURE — 76641 ULTRASOUND BREAST COMPLETE: CPT

## 2025-03-06 PROCEDURE — 77066 DX MAMMO INCL CAD BI: CPT | Mod: 26

## 2025-03-06 PROCEDURE — 77066 DX MAMMO INCL CAD BI: CPT
